# Patient Record
Sex: FEMALE | Race: BLACK OR AFRICAN AMERICAN | NOT HISPANIC OR LATINO | Employment: UNEMPLOYED | URBAN - METROPOLITAN AREA
[De-identification: names, ages, dates, MRNs, and addresses within clinical notes are randomized per-mention and may not be internally consistent; named-entity substitution may affect disease eponyms.]

---

## 2024-02-23 ENCOUNTER — CLINICAL SUPPORT (OUTPATIENT)
Dept: EMERGENCY MEDICINE | Facility: HOSPITAL | Age: 39
End: 2024-02-23
Payer: COMMERCIAL

## 2024-02-23 ENCOUNTER — HOSPITAL ENCOUNTER (EMERGENCY)
Facility: HOSPITAL | Age: 39
Discharge: PSYCHIATRIC OR SUBSTANCE USE FACILITY | End: 2024-02-24
Attending: EMERGENCY MEDICINE
Payer: COMMERCIAL

## 2024-02-23 VITALS
SYSTOLIC BLOOD PRESSURE: 146 MMHG | TEMPERATURE: 97.7 F | RESPIRATION RATE: 16 BRPM | OXYGEN SATURATION: 98 % | BODY MASS INDEX: 29.03 KG/M2 | HEIGHT: 67 IN | WEIGHT: 185 LBS | HEART RATE: 90 BPM | DIASTOLIC BLOOD PRESSURE: 87 MMHG

## 2024-02-23 DIAGNOSIS — F30.9 MANIA (MULTI): Primary | ICD-10-CM

## 2024-02-23 LAB
ALBUMIN SERPL BCP-MCNC: 4.2 G/DL (ref 3.4–5)
ALP SERPL-CCNC: 64 U/L (ref 33–110)
ALT SERPL W P-5'-P-CCNC: 18 U/L (ref 7–45)
AMPHETAMINES UR QL SCN: ABNORMAL
ANION GAP SERPL CALC-SCNC: 13 MMOL/L (ref 10–20)
APAP SERPL-MCNC: <10 UG/ML
APPEARANCE UR: CLEAR
AST SERPL W P-5'-P-CCNC: 26 U/L (ref 9–39)
BACTERIA #/AREA URNS AUTO: ABNORMAL /HPF
BARBITURATES UR QL SCN: ABNORMAL
BASOPHILS # BLD AUTO: 0.03 X10*3/UL (ref 0–0.1)
BASOPHILS NFR BLD AUTO: 0.4 %
BENZODIAZ UR QL SCN: ABNORMAL
BILIRUB SERPL-MCNC: 0.9 MG/DL (ref 0–1.2)
BILIRUB UR STRIP.AUTO-MCNC: NEGATIVE MG/DL
BUN SERPL-MCNC: 7 MG/DL (ref 6–23)
BZE UR QL SCN: ABNORMAL
CALCIUM SERPL-MCNC: 9.5 MG/DL (ref 8.6–10.6)
CANNABINOIDS UR QL SCN: ABNORMAL
CHLORIDE SERPL-SCNC: 106 MMOL/L (ref 98–107)
CO2 SERPL-SCNC: 24 MMOL/L (ref 21–32)
COLOR UR: YELLOW
CREAT SERPL-MCNC: 0.74 MG/DL (ref 0.5–1.05)
EGFRCR SERPLBLD CKD-EPI 2021: >90 ML/MIN/1.73M*2
EOSINOPHIL # BLD AUTO: 0.16 X10*3/UL (ref 0–0.7)
EOSINOPHIL NFR BLD AUTO: 2.3 %
ERYTHROCYTE [DISTWIDTH] IN BLOOD BY AUTOMATED COUNT: 13.3 % (ref 11.5–14.5)
ETHANOL SERPL-MCNC: <10 MG/DL
FENTANYL+NORFENTANYL UR QL SCN: ABNORMAL
FLUAV RNA RESP QL NAA+PROBE: NOT DETECTED
FLUBV RNA RESP QL NAA+PROBE: NOT DETECTED
GLUCOSE SERPL-MCNC: 84 MG/DL (ref 74–99)
GLUCOSE UR STRIP.AUTO-MCNC: NORMAL MG/DL
HCT VFR BLD AUTO: 38.2 % (ref 36–46)
HGB BLD-MCNC: 12.8 G/DL (ref 12–16)
IMM GRANULOCYTES # BLD AUTO: 0.01 X10*3/UL (ref 0–0.7)
IMM GRANULOCYTES NFR BLD AUTO: 0.1 % (ref 0–0.9)
KETONES UR STRIP.AUTO-MCNC: ABNORMAL MG/DL
LEUKOCYTE ESTERASE UR QL STRIP.AUTO: NEGATIVE
LYMPHOCYTES # BLD AUTO: 2.94 X10*3/UL (ref 1.2–4.8)
LYMPHOCYTES NFR BLD AUTO: 41.4 %
MCH RBC QN AUTO: 27.4 PG (ref 26–34)
MCHC RBC AUTO-ENTMCNC: 33.5 G/DL (ref 32–36)
MCV RBC AUTO: 82 FL (ref 80–100)
MONOCYTES # BLD AUTO: 1.06 X10*3/UL (ref 0.1–1)
MONOCYTES NFR BLD AUTO: 14.9 %
MUCOUS THREADS #/AREA URNS AUTO: ABNORMAL /LPF
NEUTROPHILS # BLD AUTO: 2.91 X10*3/UL (ref 1.2–7.7)
NEUTROPHILS NFR BLD AUTO: 40.9 %
NITRITE UR QL STRIP.AUTO: NEGATIVE
NRBC BLD-RTO: 0 /100 WBCS (ref 0–0)
OPIATES UR QL SCN: ABNORMAL
OXYCODONE+OXYMORPHONE UR QL SCN: ABNORMAL
PCP UR QL SCN: ABNORMAL
PH UR STRIP.AUTO: 6 [PH]
PLATELET # BLD AUTO: 366 X10*3/UL (ref 150–450)
POTASSIUM SERPL-SCNC: 4.1 MMOL/L (ref 3.5–5.3)
PREGNANCY TEST URINE, POC: NEGATIVE
PROT SERPL-MCNC: 7.3 G/DL (ref 6.4–8.2)
PROT UR STRIP.AUTO-MCNC: ABNORMAL MG/DL
RBC # BLD AUTO: 4.67 X10*6/UL (ref 4–5.2)
RBC # UR STRIP.AUTO: NEGATIVE /UL
RBC #/AREA URNS AUTO: ABNORMAL /HPF
SALICYLATES SERPL-MCNC: <3 MG/DL
SARS-COV-2 RNA RESP QL NAA+PROBE: NOT DETECTED
SODIUM SERPL-SCNC: 139 MMOL/L (ref 136–145)
SP GR UR STRIP.AUTO: 1.02
SQUAMOUS #/AREA URNS AUTO: ABNORMAL /HPF
UROBILINOGEN UR STRIP.AUTO-MCNC: NORMAL MG/DL
WBC # BLD AUTO: 7.1 X10*3/UL (ref 4.4–11.3)
WBC #/AREA URNS AUTO: ABNORMAL /HPF

## 2024-02-23 PROCEDURE — 93010 ELECTROCARDIOGRAM REPORT: CPT | Performed by: EMERGENCY MEDICINE

## 2024-02-23 PROCEDURE — 81025 URINE PREGNANCY TEST: CPT | Performed by: STUDENT IN AN ORGANIZED HEALTH CARE EDUCATION/TRAINING PROGRAM

## 2024-02-23 PROCEDURE — 99285 EMERGENCY DEPT VISIT HI MDM: CPT | Performed by: EMERGENCY MEDICINE

## 2024-02-23 PROCEDURE — 93005 ELECTROCARDIOGRAM TRACING: CPT

## 2024-02-23 PROCEDURE — 80143 DRUG ASSAY ACETAMINOPHEN: CPT | Performed by: STUDENT IN AN ORGANIZED HEALTH CARE EDUCATION/TRAINING PROGRAM

## 2024-02-23 PROCEDURE — 80307 DRUG TEST PRSMV CHEM ANLYZR: CPT | Performed by: EMERGENCY MEDICINE

## 2024-02-23 PROCEDURE — 87636 SARSCOV2 & INF A&B AMP PRB: CPT | Performed by: STUDENT IN AN ORGANIZED HEALTH CARE EDUCATION/TRAINING PROGRAM

## 2024-02-23 PROCEDURE — 36415 COLL VENOUS BLD VENIPUNCTURE: CPT | Performed by: STUDENT IN AN ORGANIZED HEALTH CARE EDUCATION/TRAINING PROGRAM

## 2024-02-23 PROCEDURE — 80053 COMPREHEN METABOLIC PANEL: CPT | Performed by: STUDENT IN AN ORGANIZED HEALTH CARE EDUCATION/TRAINING PROGRAM

## 2024-02-23 PROCEDURE — 85025 COMPLETE CBC W/AUTO DIFF WBC: CPT | Performed by: STUDENT IN AN ORGANIZED HEALTH CARE EDUCATION/TRAINING PROGRAM

## 2024-02-23 PROCEDURE — 81001 URINALYSIS AUTO W/SCOPE: CPT | Mod: 59 | Performed by: STUDENT IN AN ORGANIZED HEALTH CARE EDUCATION/TRAINING PROGRAM

## 2024-02-23 SDOH — HEALTH STABILITY: MENTAL HEALTH: DEPRESSION SYMPTOMS: NO PROBLEMS REPORTED OR OBSERVED.

## 2024-02-23 SDOH — HEALTH STABILITY: MENTAL HEALTH: HAVE YOU EVER TRIED TO KILL YOURSELF?: NO

## 2024-02-23 SDOH — HEALTH STABILITY: MENTAL HEALTH: WISH TO BE DEAD (PAST 1 MONTH): NO

## 2024-02-23 SDOH — HEALTH STABILITY: MENTAL HEALTH: IN THE PAST FEW WEEKS, HAVE YOU WISHED YOU WERE DEAD?: NO

## 2024-02-23 SDOH — HEALTH STABILITY: MENTAL HEALTH: IN THE PAST WEEK, HAVE YOU BEEN HAVING THOUGHTS ABOUT KILLING YOURSELF?: NO

## 2024-02-23 SDOH — HEALTH STABILITY: MENTAL HEALTH: NON-SPECIFIC ACTIVE SUICIDAL THOUGHTS (PAST 1 MONTH): NO

## 2024-02-23 SDOH — HEALTH STABILITY: MENTAL HEALTH: IN THE PAST FEW WEEKS, HAVE YOU FELT THAT YOU OR YOUR FAMILY WOULD BE BETTER OFF IF YOU WERE DEAD?: NO

## 2024-02-23 SDOH — HEALTH STABILITY: MENTAL HEALTH: SUICIDAL BEHAVIOR (LIFETIME): NO

## 2024-02-23 SDOH — HEALTH STABILITY: MENTAL HEALTH: ARE YOU HAVING THOUGHTS OF KILLING YOURSELF RIGHT NOW?: NO

## 2024-02-23 SDOH — HEALTH STABILITY: MENTAL HEALTH: ANXIETY SYMPTOMS: GENERALIZED

## 2024-02-23 SDOH — ECONOMIC STABILITY: HOUSING INSECURITY: FEELS SAFE LIVING IN HOME: YES

## 2024-02-23 ASSESSMENT — LIFESTYLE VARIABLES
HAVE YOU EVER FELT YOU SHOULD CUT DOWN ON YOUR DRINKING: NO
SUBSTANCE_ABUSE_PAST_12_MONTHS: YES
EVER FELT BAD OR GUILTY ABOUT YOUR DRINKING: NO
PRESCIPTION_ABUSE_PAST_12_MONTHS: NO
EVER HAD A DRINK FIRST THING IN THE MORNING TO STEADY YOUR NERVES TO GET RID OF A HANGOVER: NO
HAVE PEOPLE ANNOYED YOU BY CRITICIZING YOUR DRINKING: NO

## 2024-02-23 ASSESSMENT — COLUMBIA-SUICIDE SEVERITY RATING SCALE - C-SSRS
6. HAVE YOU EVER DONE ANYTHING, STARTED TO DO ANYTHING, OR PREPARED TO DO ANYTHING TO END YOUR LIFE?: NO
1. IN THE PAST MONTH, HAVE YOU WISHED YOU WERE DEAD OR WISHED YOU COULD GO TO SLEEP AND NOT WAKE UP?: NO
2. HAVE YOU ACTUALLY HAD ANY THOUGHTS OF KILLING YOURSELF?: NO

## 2024-02-23 ASSESSMENT — PAIN SCALES - GENERAL: PAINLEVEL_OUTOF10: 0 - NO PAIN

## 2024-02-23 ASSESSMENT — PAIN DESCRIPTION - PROGRESSION: CLINICAL_PROGRESSION: NOT CHANGED

## 2024-02-23 ASSESSMENT — PAIN - FUNCTIONAL ASSESSMENT: PAIN_FUNCTIONAL_ASSESSMENT: 0-10
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
## 2024-02-23 NOTE — PROGRESS NOTES
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
## 2024-02-23 NOTE — PROGRESS NOTES
Patient was handed off to me from the previous team. For full history, physical, and prior ED course, please see previous provider note prior to patient handoff. This is an addendum to the record.    Briefly, this is a 38-year-old female presenting with concern for psychosis in the setting of her bipolar disorder and not being on medication.  Upper at time of signout, patient was pending placement by EPAT.  On review of workup, patient is medically cleared to be placed in an inpatient psychiatric facility.  Patient has remained hemodynamically stable throughout my shift with no acute events.      Throughout the ED stay, the patient was monitored and re-examined for any changes in stability or symptomatology.     Pt seen and discussed with Dr. Shen.    Sarah Alberto DO.  Emergency Medicine PGY-2

## 2024-02-23 NOTE — ED TRIAGE NOTES
Patient arrives to the ED via PD and EMS for and Psychiatric Evaluation. Patient daughter called emergency services d/t patient being off meds and having a manic episode. Patient denies SI/HI, auditory and visual hallucinations, but says she's Anglican and thought are scattered.

## 2024-02-23 NOTE — ED PROVIDER NOTES
HPI   Chief Complaint   Patient presents with    Psychiatric Evaluation       Is a 38-year-old female, presenting to ED brought in by EMS for psych evaluation.  She was at the time of exam, she reports that she is working for the [employer] and currently on admission, which she is difficult to provide the details with, she otherwise does not have any active complaints medically.                          Ki[place] Coma Scale Score: 15                     Patient History   No past medical history on file.  No past surgical history on file.  No family history on file.  Social History     Tobacco Use    Smoking status: Not on file    Smokeless tobacco: Not on file   Substance Use Topics    Alcohol use: Not on file    Drug use: Not on file       Physical Exam   ED Triage Vitals   Temp Pulse Resp BP   -- -- -- --      SpO2 Temp src Heart Rate Source Patient Position   -- -- -- --      BP Location FiO2 (%)     -- --       Physical Exam  Constitutional:       Appearance: Normal appearance.   HENT:      Head: Normocephalic and atraumatic.      Mouth/Throat:      Mouth: Mucous membranes are moist.   Eyes:      Extraocular Movements: Extraocular movements intact.   Cardiovascular:      Rate and Rhythm: Normal rate and regular rhythm.      Heart sounds: Normal heart sounds. No murmur heard.  Pulmonary:      Effort: Pulmonary effort is normal. No respiratory distress.      Breath sounds: Normal breath sounds. No wheezing.   Abdominal:      General: There is no distension.      Palpations: Abdomen is soft.      Tenderness: There is no abdominal tenderness. There is no guarding.   Musculoskeletal:      Right lower leg: No edema.      Left lower leg: No edema.   Skin:     General: Skin is warm and dry.   Neurological:      General: No focal deficit present.      Mental Status: She is alert and oriented to person, place, and time.   Psychiatric:      Comments: Rushed speech, scattered thoughts, difficult to redirect         ED Course & MDM    ED Course as of 02/25/24 2330 Fri Feb 23, 2024   1613 Patient is medically cleared to be placed by EPAT. [PW]      ED Course User Index  [PW] Sarah Alberto DO         Diagnoses as of 02/25/24 2330   Lindsay ([facility])       Medical Decision Making  Patient presents to the ED brought in by EMS appearing to be in a manic state.  She is tangential and has rushed speech however she is cooperative.  On my chart reviewed.  She does have a history of bipolar and has required inpatient psychiatric hospitalization as recently as March 8, 2022.  It appears previously she was on Abilify.  Per EMS report, the patient's daughter did report that she has not been on her psychiatric medication.  Due to this, we will obtain laboratory studies to medically clear her for psychiatric evaluation.    Laboratory studies are grossly unremarkable, she is cleared for EPAT evaluation    EKG interpreted by myself: Normal sinus rhythm, ventricular rate 95, normal axis, QTc 442 ms, no acute injury pattern noted.    Case discussed with EPAT, who recommended for admission.  I am in agreement with this plan  The patient is signed out to oncoming resident physician pending EPAT placement    Discussed with the attending  Ludin Tran DO PGY-4  Emergency Medicine          Procedure  Procedures     Ludin Tran DO  Resident  02/25/24 2330

## 2024-02-24 ENCOUNTER — HOSPITAL ENCOUNTER (INPATIENT)
Facility: HOSPITAL | Age: 39
LOS: 4 days | Discharge: HOME | DRG: 885 | End: 2024-02-28
Attending: PSYCHIATRY & NEUROLOGY | Admitting: PSYCHIATRY & NEUROLOGY
Payer: COMMERCIAL

## 2024-02-24 DIAGNOSIS — F25.0 SCHIZOAFFECTIVE DISORDER, BIPOLAR TYPE (MULTI): Primary | ICD-10-CM

## 2024-02-24 PROBLEM — F31.9: Status: ACTIVE | Noted: 2024-02-24

## 2024-02-24 LAB — HOLD SPECIMEN: NORMAL

## 2024-02-24 PROCEDURE — 99222 1ST HOSP IP/OBS MODERATE 55: CPT | Performed by: NURSE PRACTITIONER

## 2024-02-24 PROCEDURE — 2500000004 HC RX 250 GENERAL PHARMACY W/ HCPCS (ALT 636 FOR OP/ED): Performed by: PSYCHIATRY & NEUROLOGY

## 2024-02-24 PROCEDURE — 1240000001 HC SEMI-PRIVATE BH ROOM DAILY

## 2024-02-24 RX ORDER — LORAZEPAM 1 MG/1
1 TABLET ORAL EVERY 6 HOURS PRN
Status: DISCONTINUED | OUTPATIENT
Start: 2024-02-24 | End: 2024-02-28 | Stop reason: HOSPADM

## 2024-02-24 RX ORDER — TRAZODONE HYDROCHLORIDE 50 MG/1
50 TABLET ORAL NIGHTLY PRN
Status: DISCONTINUED | OUTPATIENT
Start: 2024-02-24 | End: 2024-02-28 | Stop reason: HOSPADM

## 2024-02-24 RX ORDER — LORAZEPAM 2 MG/ML
1 INJECTION INTRAMUSCULAR EVERY 6 HOURS PRN
Status: DISCONTINUED | OUTPATIENT
Start: 2024-02-24 | End: 2024-02-28 | Stop reason: HOSPADM

## 2024-02-24 RX ORDER — HYDRALAZINE HYDROCHLORIDE 25 MG/1
25 TABLET, FILM COATED ORAL EVERY 8 HOURS PRN
Status: DISCONTINUED | OUTPATIENT
Start: 2024-02-24 | End: 2024-02-28 | Stop reason: HOSPADM

## 2024-02-24 RX ORDER — IBUPROFEN 400 MG/1
400 TABLET ORAL EVERY 6 HOURS PRN
Status: DISCONTINUED | OUTPATIENT
Start: 2024-02-24 | End: 2024-02-28 | Stop reason: HOSPADM

## 2024-02-24 RX ORDER — OLANZAPINE 10 MG/2ML
5 INJECTION, POWDER, FOR SOLUTION INTRAMUSCULAR EVERY 6 HOURS PRN
Status: DISCONTINUED | OUTPATIENT
Start: 2024-02-24 | End: 2024-02-24

## 2024-02-24 RX ORDER — ACETAMINOPHEN 325 MG/1
650 TABLET ORAL EVERY 4 HOURS PRN
Status: DISCONTINUED | OUTPATIENT
Start: 2024-02-24 | End: 2024-02-28 | Stop reason: HOSPADM

## 2024-02-24 RX ORDER — DIPHENHYDRAMINE HCL 25 MG
50 TABLET ORAL EVERY 6 HOURS PRN
Status: DISCONTINUED | OUTPATIENT
Start: 2024-02-24 | End: 2024-02-28 | Stop reason: HOSPADM

## 2024-02-24 RX ORDER — MAGNESIUM HYDROXIDE 2400 MG/10ML
10 SUSPENSION ORAL DAILY PRN
Status: DISCONTINUED | OUTPATIENT
Start: 2024-02-24 | End: 2024-02-28 | Stop reason: HOSPADM

## 2024-02-24 RX ORDER — ALUMINUM HYDROXIDE, MAGNESIUM HYDROXIDE, AND SIMETHICONE 1200; 120; 1200 MG/30ML; MG/30ML; MG/30ML
30 SUSPENSION ORAL EVERY 6 HOURS PRN
Status: DISCONTINUED | OUTPATIENT
Start: 2024-02-24 | End: 2024-02-28 | Stop reason: HOSPADM

## 2024-02-24 RX ORDER — HYDROXYZINE HYDROCHLORIDE 25 MG/1
50 TABLET, FILM COATED ORAL EVERY 6 HOURS PRN
Status: DISCONTINUED | OUTPATIENT
Start: 2024-02-24 | End: 2024-02-28 | Stop reason: HOSPADM

## 2024-02-24 RX ORDER — PALIPERIDONE 3 MG/1
6 TABLET, EXTENDED RELEASE ORAL DAILY
Status: DISCONTINUED | OUTPATIENT
Start: 2024-02-24 | End: 2024-02-25

## 2024-02-24 RX ORDER — HALOPERIDOL 5 MG/1
5 TABLET ORAL EVERY 6 HOURS PRN
Status: DISCONTINUED | OUTPATIENT
Start: 2024-02-24 | End: 2024-02-28 | Stop reason: HOSPADM

## 2024-02-24 RX ORDER — OLANZAPINE 5 MG/1
5 TABLET ORAL EVERY 6 HOURS PRN
Status: DISCONTINUED | OUTPATIENT
Start: 2024-02-24 | End: 2024-02-24

## 2024-02-24 RX ORDER — HALOPERIDOL 5 MG/ML
5 INJECTION INTRAMUSCULAR EVERY 6 HOURS PRN
Status: DISCONTINUED | OUTPATIENT
Start: 2024-02-24 | End: 2024-02-28 | Stop reason: HOSPADM

## 2024-02-24 RX ORDER — DIPHENHYDRAMINE HYDROCHLORIDE 50 MG/ML
50 INJECTION INTRAMUSCULAR; INTRAVENOUS ONCE AS NEEDED
Status: DISCONTINUED | OUTPATIENT
Start: 2024-02-24 | End: 2024-02-28 | Stop reason: HOSPADM

## 2024-02-24 RX ADMIN — LORAZEPAM 1 MG: 2 INJECTION INTRAMUSCULAR; INTRAVENOUS at 22:47

## 2024-02-24 RX ADMIN — OLANZAPINE 5 MG: 10 INJECTION, POWDER, FOR SOLUTION INTRAMUSCULAR at 07:54

## 2024-02-24 RX ADMIN — HALOPERIDOL LACTATE 5 MG: 5 INJECTION, SOLUTION INTRAMUSCULAR at 22:47

## 2024-02-24 SDOH — SOCIAL STABILITY: SOCIAL INSECURITY: ARE YOU OR HAVE YOU BEEN THREATENED OR ABUSED PHYSICALLY, EMOTIONALLY, OR SEXUALLY BY ANYONE?: UNABLE TO ASSESS

## 2024-02-24 SDOH — SOCIAL STABILITY: SOCIAL INSECURITY: DOES ANYONE TRY TO KEEP YOU FROM HAVING/CONTACTING OTHER FRIENDS OR DOING THINGS OUTSIDE YOUR HOME?: UNABLE TO ASSESS

## 2024-02-24 SDOH — SOCIAL STABILITY: SOCIAL INSECURITY: WERE YOU ABLE TO COMPLETE ALL THE BEHAVIORAL HEALTH SCREENINGS?: NO

## 2024-02-24 SDOH — SOCIAL STABILITY: SOCIAL INSECURITY: ABUSE: ADULT

## 2024-02-24 SDOH — SOCIAL STABILITY: SOCIAL INSECURITY: HAS ANYONE EVER THREATENED TO HURT YOUR FAMILY OR YOUR PETS?: UNABLE TO ASSESS

## 2024-02-24 SDOH — SOCIAL STABILITY: SOCIAL INSECURITY: ARE THERE ANY APPARENT SIGNS OF INJURIES/BEHAVIORS THAT COULD BE RELATED TO ABUSE/NEGLECT?: UNABLE TO ASSESS

## 2024-02-24 SDOH — SOCIAL STABILITY: SOCIAL INSECURITY: DO YOU FEEL ANYONE HAS EXPLOITED OR TAKEN ADVANTAGE OF YOU FINANCIALLY OR OF YOUR PERSONAL PROPERTY?: UNABLE TO ASSESS

## 2024-02-24 SDOH — SOCIAL STABILITY: SOCIAL INSECURITY: HAVE YOU HAD THOUGHTS OF HARMING ANYONE ELSE?: NO

## 2024-02-24 SDOH — SOCIAL STABILITY: SOCIAL INSECURITY: DO YOU FEEL UNSAFE GOING BACK TO THE PLACE WHERE YOU ARE LIVING?: UNABLE TO ASSESS

## 2024-02-24 ASSESSMENT — LIFESTYLE VARIABLES
HOW MANY STANDARD DRINKS CONTAINING ALCOHOL DO YOU HAVE ON A TYPICAL DAY: 1 OR 2
SKIP TO QUESTIONS 9-10: 1
AUDIT-C TOTAL SCORE: 1
HOW OFTEN DO YOU HAVE A DRINK CONTAINING ALCOHOL: MONTHLY OR LESS
AUDITORY DISTURBANCES: NOT PRESENT
ORIENTATION AND CLOUDING OF SENSORIUM: ORIENTED AND CAN DO SERIAL ADDITIONS
PAROXYSMAL SWEATS: NO SWEAT VISIBLE
NAUSEA AND VOMITING: NO NAUSEA AND NO VOMITING
TREMOR: NO TREMOR
AGITATION: NORMAL ACTIVITY
VISUAL DISTURBANCES: NOT PRESENT
ANXIETY: MILDLY ANXIOUS
HOW OFTEN DO YOU HAVE 6 OR MORE DRINKS ON ONE OCCASION: NEVER
AUDIT-C TOTAL SCORE: 1
CIWA TOTAL SCORE: 1
HEADACHE, FULLNESS IN HEAD: NOT PRESENT
TOTAL_SCORE: 5

## 2024-02-24 ASSESSMENT — PAIN SCALES - GENERAL
PAINLEVEL_OUTOF10: 0 - NO PAIN

## 2024-02-24 ASSESSMENT — ACTIVITIES OF DAILY LIVING (ADL)
LACK_OF_TRANSPORTATION: YES
WALKS IN HOME: INDEPENDENT
TOILETING: INDEPENDENT
GROOMING: INDEPENDENT
BATHING: INDEPENDENT
HEARING - RIGHT EAR: FUNCTIONAL
FEEDING YOURSELF: INDEPENDENT
ADEQUATE_TO_COMPLETE_ADL: YES
JUDGMENT_ADEQUATE_SAFELY_COMPLETE_DAILY_ACTIVITIES: NO
HEARING - LEFT EAR: FUNCTIONAL
DRESSING YOURSELF: INDEPENDENT
PATIENT'S MEMORY ADEQUATE TO SAFELY COMPLETE DAILY ACTIVITIES?: UNABLE TO ASSESS

## 2024-02-24 ASSESSMENT — PAIN - FUNCTIONAL ASSESSMENT
PAIN_FUNCTIONAL_ASSESSMENT: 0-10

## 2024-02-24 ASSESSMENT — PATIENT HEALTH QUESTIONNAIRE - PHQ9
1. LITTLE INTEREST OR PLEASURE IN DOING THINGS: NOT AT ALL
2. FEELING DOWN, DEPRESSED OR HOPELESS: NOT AT ALL
SUM OF ALL RESPONSES TO PHQ9 QUESTIONS 1 & 2: 0

## 2024-02-24 ASSESSMENT — COLUMBIA-SUICIDE SEVERITY RATING SCALE - C-SSRS
2. HAVE YOU ACTUALLY HAD ANY THOUGHTS OF KILLING YOURSELF?: NO
1. SINCE LAST CONTACT, HAVE YOU WISHED YOU WERE DEAD OR WISHED YOU COULD GO TO SLEEP AND NOT WAKE UP?: NO
6. HAVE YOU EVER DONE ANYTHING, STARTED TO DO ANYTHING, OR PREPARED TO DO ANYTHING TO END YOUR LIFE?: NO

## 2024-02-24 NOTE — CONSULTS
Consults    Hospital medicine consulted for Adult Medical Examination and Optimization for Behavioral Health Unit    History Of Present Illness  This 38-year-old female admitted to the inpatient psychiatric unit at [facility].  Patient was brought in via EMS after being called by patient's daughter.  Per patient's family patient has been off of her psychiatric medications.  Patient exhibiting significant manic behavior with disorganized thinking described as scattered thoughts on ER provider notes.  In the ED admitting providers inquired in regards to SI and HI both of which patient denied. Patient was medically cleared for EPAT evaluation. Hospitalist team consulted for adult medical examination optimization for behavioral health unit.    On vital sign review patient with fluctuating blood pressures but did note hypertensive urgency with sinus tachycardia around 7 AM on 2/24/2024.  Patient with significant manic behavior and agitation requiring IM Zyprexa by nursing at the time of noted vital signs.     Hospitalist to evaluate medical optimization for psychiatric treatment and evaluation.  Neurological evaluation completed.  No acute or chronic medical issues identified at this time that could be contributing to underlying psychiatric symptoms. Pt is medically optimized for inpatient behavioral health evaluation and treatment.    Patient examined and seen. Alert and oriented x3, explained to patient assessment, right to , and the right to decline assessment. Patient verbalized understanding and agreed to assessment without . Patient denies chest pain, shortness of breath, palpitations, abdominal pain, fever or chills.    Review of systems: 10 system were reviewed and were negative except what was mentioned in history of present illness    Past Medical History  -Bipolar disorder    -4/2022 Thyroid US: heterogenous thyroid without definite dominant nodule, possibly representing sequale of  thyroiditis, suggest co-relation with thyroid function studies and non emergent thyroid sonography. TSH 3/2023 ENL at 1.26    -Presumed Asthma: cough and SOB with wheezing intermittently sine age of 14. Has refused OP PFTs for asthma workup with PCP per PCP notes    Surgical History   x 3     Social History  Tobacco smoker, marijuana use (UDS + THC)  Social History     Socioeconomic History    Marital status: Single     Spouse name: Not on file    Number of children: Not on file    Years of education: Not on file    Highest education level: Not on file   Occupational History    Not on file   Tobacco Use    Smoking status: Former     Types: Cigarettes    Smokeless tobacco: Not on file   Substance and Sexual Activity    Alcohol use: Not on file    Drug use: Not on file    Sexual activity: Not on file   Other Topics Concern    Not on file   Social History Narrative    Not on file     Social Determinants of Health     Financial Resource Strain: Patient Unable To Answer (2024)    Overall Financial Resource Strain (CARDIA)     Difficulty of Paying Living Expenses: Patient unable to answer   Food Insecurity: Not on file   Transportation Needs: Unmet Transportation Needs (2024)    PRAPARE - Transportation     Lack of Transportation (Medical): Yes     Lack of Transportation (Non-Medical): Yes   Physical Activity: Not on file   Stress: Not on file   Social Connections: Not on file   Intimate Partner Violence: Not on file   Housing Stability: High Risk (2024)    Housing Stability Vital Sign     Unable to Pay for Housing in the Last Year: Yes     Number of Places Lived in the Last Year: 2     Unstable Housing in the Last Year: Yes        Family History  CVA- mother, non-specified heart problems-father     Allergies  No Known Allergies       Physical Exam  Constitutional: Well developed, awake/alert/oriented x3, no distress, cooperative  Eyes: PERRL, EOMI, clear sclera  ENMT: mucous membranes moist, no  apparent injury, no lesions seen  Head/Neck: Neck supple, no apparent injury, thyroid without mass or tenderness  Respiratory/Thorax: Patent airways,  normal breath sounds with good   Cardiovascular: Regular, rate and rhythm, no murmurs, 2+ equal pulses of the extremities, normal S 1and S 2  Gastrointestinal: Nondistended, soft, non-tender,    Genitourinary: denies CVA tenderness  or suprapubic tenderness,  voiding freely   Musculoskeletal: ROM intact  Extremities: normal extremities,  no contusions or wounds seen   Skin: warm, dry, intact except as noted   Neurological: alert/oriented x 3, speech clear, cranial nerves II through XII  grossly intact  Psychiatric: pleasant   Cranial Nerve Exam: II, III, IV, VI: Visual acuity within normal limits bilaterally, visual fields normal in all quadrants, LESLYE, EOMI  Cranial Nerve exam: V: Facial sensations intact bilaterally to dull, sharp, and light touch stimuli  Cranial Nerve exam VII: Facial muscle strength normal/equal bilaterally  Cranial Nerve Exam VIII: Hearing is normal bilaterally  Cranial Nerve IX,X: Palate and Uvula symmetrical, voice is normal  Cranial Nerve XI: Shoulder shrug strong, equal bilaterally  Cranial Nerve XII: Tongue moves symmetrically  Motor : Good muscle tone, Strength equal upper and lower extremities unless otherwise stated above  Cerebellar: normal gait unless otherwise stated above     Last Recorded Vitals  Visit Vitals  BP (!) 141/103   Pulse (!) 122   Temp 36.4 °C (97.5 °F)   Resp 16        Scheduled medications     Continuous medications     PRN medications  PRN medications: acetaminophen, alum-mag hydroxide-simeth, diphenhydrAMINE **OR** diphenhydrAMINE, hydrOXYzine HCL, ibuprofen, magnesium hydroxide, OLANZapine **OR** OLANZapine, traZODone     Relevant Results  Results for orders placed or performed during the hospital encounter of 02/23/24 (from the past 96 hour(s))   CBC and Auto Differential   Result Value Ref Range    WBC 7.1 4.4 -  11.3 x10*3/uL    nRBC 0.0 0.0 - 0.0 /100 WBCs    RBC 4.67 4.00 - 5.20 x10*6/uL    Hemoglobin 12.8 12.0 - 16.0 g/dL    Hematocrit 38.2 36.0 - 46.0 %    MCV 82 80 - 100 fL    MCH 27.4 26.0 - 34.0 pg    MCHC 33.5 32.0 - 36.0 g/dL    RDW 13.3 11.5 - 14.5 %    Platelets 366 150 - 450 x10*3/uL    Neutrophils % 40.9 40.0 - 80.0 %    Immature Granulocytes %, Automated 0.1 0.0 - 0.9 %    Lymphocytes % 41.4 13.0 - 44.0 %    Monocytes % 14.9 2.0 - 10.0 %    Eosinophils % 2.3 0.0 - 6.0 %    Basophils % 0.4 0.0 - 2.0 %    Neutrophils Absolute 2.91 1.20 - 7.70 x10*3/uL    Immature Granulocytes Absolute, Automated 0.01 0.00 - 0.70 x10*3/uL    Lymphocytes Absolute 2.94 1.20 - 4.80 x10*3/uL    Monocytes Absolute 1.06 (H) 0.10 - 1.00 x10*3/uL    Eosinophils Absolute 0.16 0.00 - 0.70 x10*3/uL    Basophils Absolute 0.03 0.00 - 0.10 x10*3/uL   Comprehensive metabolic panel   Result Value Ref Range    Glucose 84 74 - 99 mg/dL    Sodium 139 136 - 145 mmol/L    Potassium 4.1 3.5 - 5.3 mmol/L    Chloride 106 98 - 107 mmol/L    Bicarbonate 24 21 - 32 mmol/L    Anion Gap 13 10 - 20 mmol/L    Urea Nitrogen 7 6 - 23 mg/dL    Creatinine 0.74 0.50 - 1.05 mg/dL    eGFR >90 >60 mL/min/1.73m*2    Calcium 9.5 8.6 - 10.6 mg/dL    Albumin 4.2 3.4 - 5.0 g/dL    Alkaline Phosphatase 64 33 - 110 U/L    Total Protein 7.3 6.4 - 8.2 g/dL    AST 26 9 - 39 U/L    Bilirubin, Total 0.9 0.0 - 1.2 mg/dL    ALT 18 7 - 45 U/L   Acute Toxicology Panel, Blood   Result Value Ref Range    Acetaminophen <10.0 10.0 - 30.0 ug/mL    Salicylate  <3 4 - 20 mg/dL    Alcohol <10 <=10 mg/dL   Sars-CoV-2 and Influenza A/B PCR   Result Value Ref Range    Flu A Result Not Detected Not Detected    Flu B Result Not Detected Not Detected    Coronavirus 2019, PCR Not Detected Not Detected   Urinalysis with Reflex Culture and Microscopic   Result Value Ref Range    Color, Urine Yellow Light-Yellow, Yellow, Dark-Yellow    Appearance, Urine Clear Clear    Specific Gravity, Urine 1.020  1.005 - 1.035    pH, Urine 6.0 5.0, 5.5, 6.0, 6.5, 7.0, 7.5, 8.0    Protein, Urine 30 (1+) (A) NEGATIVE, 10 (TRACE), 20 (TRACE) mg/dL    Glucose, Urine Normal Normal mg/dL    Blood, Urine NEGATIVE NEGATIVE    Ketones, Urine 20 (1+) (A) NEGATIVE mg/dL    Bilirubin, Urine NEGATIVE NEGATIVE    Urobilinogen, Urine Normal Normal mg/dL    Nitrite, Urine NEGATIVE NEGATIVE    Leukocyte Esterase, Urine NEGATIVE NEGATIVE   Extra Urine Gray Tube   Result Value Ref Range    Extra Tube Hold for add-ons.    Drug Screen, Urine   Result Value Ref Range    Amphetamine Screen, Urine Presumptive Negative Presumptive Negative    Barbiturate Screen, Urine Presumptive Negative Presumptive Negative    Benzodiazepines Screen, Urine Presumptive Negative Presumptive Negative    Cannabinoid Screen, Urine Presumptive Positive (A) Presumptive Negative    Cocaine Metabolite Screen, Urine Presumptive Negative Presumptive Negative    Fentanyl Screen, Urine Presumptive Negative Presumptive Negative    Opiate Screen, Urine Presumptive Negative Presumptive Negative    Oxycodone Screen, Urine Presumptive Negative Presumptive Negative    PCP Screen, Urine Presumptive Negative Presumptive Negative   Urinalysis Microscopic   Result Value Ref Range    WBC, Urine 1-5 1-5, NONE /HPF    RBC, Urine NONE NONE, 1-2, 3-5 /HPF    Squamous Epithelial Cells, Urine 1-9 (SPARSE) Reference range not established. /HPF    Bacteria, Urine 1+ (A) NONE SEEN /HPF    Mucus, Urine 4+ Reference range not established. /LPF   POCT pregnancy, urine   Result Value Ref Range    Preg Test, Ur Negative Negative        EKG: Normal sinus rhythm 95 bpm, possible left atrial enlargement.  QTc 442 ms.  No acute ischemic changes.     Assessment and Plan    Principal Problem:    Bipolar disorder, current condition not specified as either manic or depressive  Admit to Behavioral Health Unit  Contract for Safety   Safety Protocols  Medications to be determined by psychiatry   Vital Signs  BID  Group Therapy as appropriate  Social Work for outpatient therapy   Encourage Healthy Lifestyle and Exercise as appropriate    Tobacco Dependency / Substance use  Nicotine cessation   Risks discussed  Encourage abstinence of tobacco and marijuana  Inpatient/Outpatient treatment therapies  Nicotine patch at patient request     Intermittent HTN urgency during agitation  Continue to monitor BP trend.   Utilize prn PO hydralazine  TSH 3/2023 1.26    Obesity  Dietary control and increased physical activity   Follow up with PCP for management of this and other chronic/non-acute conditions and/or symptoms     Hospital medicine will follow BP trend peripherally.     A total of 68 minutes was spent on this visit    Plan of care was discussed extensively with patient. Patient verbalized understanding through teach back method. All questions and concerns addressed upon examination.     Of note, this documentation is completed using the Dragon Dictation system (voice recognition software). There may be spelling and/or grammatical errors that were not corrected prior to final submission.

## 2024-02-24 NOTE — CARE PLAN
The patient's goals for the shift include send me home    The clinical goals for the shift include no med side effects    Over the shift, the patient did not make progress toward the following goals of taking her meds. Barriers to progression include recent admission. Recommendations to address these barriers include continued hospitalization and med review.     See note from this morning. Pt awoke after received zyprexa for lunch and remained internally stimulated, declined invega. Pt not respecting personal space. Pt can be verbally threatening at times. Pt did respond well to having radio. Pt ate well at  lunch and dinner. Pt on phone yelling at someone but did brin phone back to desk and did not act out in any way or throw phone or radio. Pt denies being suicidal. Pt denies hallucinations though heard and seen talking to self and walls.

## 2024-02-24 NOTE — SIGNIFICANT EVENT
Application for Emergency Admission      Ready for Transfer?  Is the patient medically cleared for transfer to inpatient psychiatry: Yes  Has the patient been accepted to an inpatient psychiatric hospital: Yes    Application for Emergency Admission  IN ACCORDANCE [place]  The Chief Clinical Officer of: MARLA Romero 2/23/2024 .8:34 PM    Reason for Hospitalization  The undersigned has reason to believe that: Aida Butts Is a mentally ill person subject to hospitalization by court order under division B Section 5122.01 of the Revised Code, i.e., this person:    1.Yes  Represents a substantial risk of physical harm to self as manifested by evidence of threats of, or attempts at, suicide or serious self-inflicted bodily harm    2.Yes Represents a substantial risk of physical harm to others as manifested by evidence of recent homicidal or other violent behavior, evidence of recent threats that place another in reasonable fear of violent behavior and serious physical harm, or other evidence of present dangerousness    3.Yes Represents a substantial and immediate risk of serious physical impairment or injury to self as manifested by  evidence that the person is unable to provide for and is not providing for the person's basic physical needs because of the person's mental illness and that appropriate provision for those needs cannot be made  immediately available in the community    4.Yes Would benefit from treatment in a hospital for his mental illness and is in need of such treatment as manifested by evidence of behavior that creates a grave and imminent risk to substantial rights of others or  himself.    5.Yes Would benefit from treatment as manifested by evidence of behavior that indicates all of the following:       (a) The person is unlikely to survive safely in the community without supervision, based on a clinical determination.       (b) The person has a history of lack of compliance with  treatment for mental illness and one of the following applies:      (i) At least twice within the thirty-six months prior to the filing of an affidavit seeking court-ordered treatment of the person under section 5122.111 of the Revised Code, the lack of compliance has been a significant factor in necessitating hospitalization in a hospital or receipt of services in a forensic or other mental health unit of a correctional facility, provided that the thirty-six-month period shall be extended by the length of any hospitalization or incarceration of the person that occurred within the thirty-six-month period.      (ii) Within the forty-eight months prior to the filing of an affidavit seeking court-ordered treatment of the person under section 5122.111 of the Revised Code, the lack of compliance resulted in one or more acts of serious violent behavior toward self or others or threats of, or attempts at, serious physical harm to self or others, provided that the forty-eight-month period shall be extended by the length of any hospitalization or incarceration of the person that occurred within the forty-eight-month period.      (c) The person, as a result of mental illness, is unlikely to voluntarily participate in necessary treatment.       (d) In view of the person's treatment history and current behavior, the person is in need of treatment in order to prevent a relapse or deterioration that would be likely to result in substantial risk of serious harm to the person or others.    (e) Represents a substantial risk of physical harm to self or others if allowed to remain at liberty pending examination.    Therefore, it is requested that said person be admitted to the above named facility.    STATEMENT OF BELIEF    Must be filled out by one of the following: a psychiatrist, licensed physician, licensed clinical psychologist, health or ,  or .  (Statement shall include the circumstances under  which the individual was taken into custody and the reason for the person's belief that hospitalization is necessary. The statement shall also include a reference to efforts made to secure the individual's property at his residence if he was taken into custody there. Every reasonable and appropriate effort should be made to take this person into custody in the least conspicuous manner possible.)    Patient demonstrates clinical signs of decompensated bipolar disorder secondary to poor adherence to medication.  Demonstrated manic thoughts and pressured speech, flight of ideas.  Patient would benefit from admission to inpatient psychiatric facility for stabilization of her known mental health disorder.    Sarah Alberto,  2/23/2024     _____________________________________________________________   Place of Employment: [facility]    STATEMENT OF OBSERVATION BY PSYCHIATRIST, LICENSED PHYSICIAN, OR LICENSED CLINICAL PSYCHOLOGIST, IF APPLICABLE    Place of Observation (e.g., [facility], general hospital, office, emergency facility)  (If applicable, please complete)    Sarah Alberto DO 2/23/2024    _____________________________________________________________

## 2024-02-24 NOTE — SIGNIFICANT EVENT
Unable to interview the patient due to patient currently being sedated from the morning Zyprexa that she received following agitated behavior.  She was admitted last night and I did review her chart.  Started around Invega 6 mg tonight    Will do a complete psychiatry evaluation tomorrow      02/24/24 at 12:12 PM - Lauro Lynch MD

## 2024-02-24 NOTE — NURSING NOTE
Medicated with IM zyprexa with two security at standby, pt yelling at staff and peers. Pt taking clothing off. Pt internally stimulated, responding to voices, smacking self. Allowed shot to be given. Pt now in dayroom eating, going over menu with staff.

## 2024-02-24 NOTE — NURSING NOTE
Refusing invega and hydralazine for BP. Pt states  that she does not take meds/drugs. Pt awoke for lunch and continues to be internally stimulated, talking loudly to self, interrupting peers, getting into their personal space at times but not touching them. Pt appears to be knocking over drinks on purpose.  Paranoid about the rovers(phone devices)that checks are done on, thinks that people are recording her. Pt did come to desk and ask to use phone, cursing at whoever she was talking to. Pt now in quiet room on phone.

## 2024-02-25 LAB
ATRIAL RATE: 95 BPM
P AXIS: 32 DEGREES
P OFFSET: 195 MS
P ONSET: 145 MS
PR INTERVAL: 156 MS
Q ONSET: 223 MS
QRS COUNT: 16 BEATS
QRS DURATION: 76 MS
QT INTERVAL: 352 MS
QTC CALCULATION(BAZETT): 442 MS
QTC FREDERICIA: 410 MS
R AXIS: 22 DEGREES
T AXIS: 28 DEGREES
T OFFSET: 399 MS
VENTRICULAR RATE: 95 BPM

## 2024-02-25 PROCEDURE — 1240000001 HC SEMI-PRIVATE BH ROOM DAILY

## 2024-02-25 PROCEDURE — 97165 OT EVAL LOW COMPLEX 30 MIN: CPT | Mod: GO

## 2024-02-25 PROCEDURE — 97530 THERAPEUTIC ACTIVITIES: CPT | Mod: GO

## 2024-02-25 PROCEDURE — 99222 1ST HOSP IP/OBS MODERATE 55: CPT | Performed by: PSYCHIATRY & NEUROLOGY

## 2024-02-25 RX ORDER — ARIPIPRAZOLE 5 MG/1
5 TABLET ORAL DAILY
Status: DISCONTINUED | OUTPATIENT
Start: 2024-02-25 | End: 2024-02-26

## 2024-02-25 ASSESSMENT — PAIN SCALES - GENERAL
PAINLEVEL_OUTOF10: 0 - NO PAIN
PAINLEVEL_OUTOF10: 0 - NO PAIN

## 2024-02-25 ASSESSMENT — PAIN - FUNCTIONAL ASSESSMENT
PAIN_FUNCTIONAL_ASSESSMENT: 0-10
PAIN_FUNCTIONAL_ASSESSMENT: 0-10

## 2024-02-25 ASSESSMENT — COLUMBIA-SUICIDE SEVERITY RATING SCALE - C-SSRS
1. SINCE LAST CONTACT, HAVE YOU WISHED YOU WERE DEAD OR WISHED YOU COULD GO TO SLEEP AND NOT WAKE UP?: NO
6. HAVE YOU EVER DONE ANYTHING, STARTED TO DO ANYTHING, OR PREPARED TO DO ANYTHING TO END YOUR LIFE?: NO
2. HAVE YOU ACTUALLY HAD ANY THOUGHTS OF KILLING YOURSELF?: NO

## 2024-02-25 NOTE — CARE PLAN
Problem: Sensory Perceptual Alteration as Evidenced by  Goal: Discusses signs/symptoms of illness/treatment options  Outcome: Progressing     Problem: Sensory Perceptual Alteration as Evidenced by  Goal: Verbalizes reduction in hallucinations/delusions  Outcome: Progressing     Problem: Sensory Perceptual Alteration as Evidenced by  Goal: Free from restraint events  Outcome: Progressing     Problem: Self Care Deficit  Goal: STG - Patient completes hygiene  Outcome: Progressing   The patient's goals for the shift include go home per pt    The clinical goals for the shift include pt will take abilify she asked dr to switch her to    Over the shift, the patient did not make progress toward the following goals of taking scheduled meds. Barriers to progression include not thinking she needs meds, remaining psychotic. Recommendations to address these barriers include continued hospitalization and med review.     Pt sleeping at intervals. Pt remains internally stimulated. Talking to self at times and religiously preoccupied. Pt continues to not respect personal space but not as bad as yesterday. Pt stated to psychiatrrist that she would not take invega but would take abilify. Pt continued to refuse abilify and was asked throughout the day. Pt did shower today, was up for snacks, no yelling out or trying to touch or interfere with peers. Pt denied suicidal thoughts, denied homicidal thoughts.

## 2024-02-25 NOTE — PROGRESS NOTES
Occupational Therapy     [facility] Occupational Therapy Assessment & Treatment    Patient Name: Aida Butts  MRN: [id number]  Today's Date: 2/25/2024      Activity:  Self-Esteem: Positive Psychology Approaches    Attendance:  Attendance  Activity: Discussion/reminisce  Participation: Active participation    Treatment Approach  Approach : Group therapy sessions, 30 to 45 minutes (Pt frequently in & out of room, participated in approx. 15 min of group)  Patient Stated Goals: none stated  Cognition: Attention, Directions (Pt alert, Ox2, easily distracted. Demonstrates limited understanding & disorganized thinking.)  Social Skills: Interacts independently in social activity, Atypical behaviors & mannerisms are observed (Interrupts at times, rather tangential, religiously preoccupied)  Emotional: Mood (Pt mood somewhat labile, affect constricted)  Treatment Approach Comments: Pt attended & participated in afternoon therapy group focused on self-esteem through positive psychology approaches. Pt given handout of various character strengths & asked to select 3 they feel they exemplify. Pt selected & shared w/ the group: wisdom, love, gratitude. Pt engaged in discussion about ways she exemplifies these traits. Finally, pt given handout titled, “Strengths Use Plan”, which provided instructions on developing a plan for intentionally using strengths. Pt asked to find at least 1 thing they can do this week to demonstrate each strength they selected. Pt left room before completing this activity.  Pt demonstrates limited understanding due to disorganized thinking & poor insight; would benefit from reinforced education.      Encounter Problems       Encounter Problems (Active)       OT Goals       Pt will explore, identify, and appropriately utilize effective coping strategies to cope with daily stressors and manage emotions with independence prior to discharge.        Start:  02/25/24    Expected End:  03/24/24            Pt will  demonstrate ability to appropriately modulate emotions and impulses with independence prior to discharge.        Start:  02/25/24    Expected End:  03/24/24            Pt will explore, identify, and appropriately utilize effective communication strategies to express feelings, wants, and needs with independence prior to discharge.        Start:  02/25/24    Expected End:  03/24/24                   Education:  Pt provided educational handouts on self-esteem through positive psychology. Reviewed, discussed, & completed “Strengths Use Plan”. Pt demonstrates limited understanding & would benefit from reinforced education.

## 2024-02-25 NOTE — PROGRESS NOTES
Occupational Therapy    OT Behavioral Health Evaluation    Patient Name: Aida Butts  MRN: [id number]  Today's Date: 2/25/2024       OT Intervention Plan:  Skilled OT interventions to include: therapeutic use of self, therapeutic use of occupations and activities, therapeutic groups (including task skill groups, life skill groups, coping skill groups, anger/grief management groups, and ADL/IADL groups), and 1:1 sessions focused on individualized goals for mental health management to improve ADL/IADL performance.      Subjective   Current Problem:  No diagnosis found.  General:  General  Reason for Referral: manic behavior impairing ADLs & IADLs  Referred By: Dr. Lynch  Past Medical History Relevant to Rehab: bipolar disorder, unspecified psychosis, most recent psychiatric hospitalization in March 2023  Prior to Session Communication: Bedside nurse  Patient Position Received: Up in chair  General Comment: 37 y/o female presents to ED after her daughter called EMS reporting pt is off medications & exhibiting manic, disorganized behavior. Upon arrival to ED, pt demonstrated symptoms of psychosis - appearing internally stimulated, rapid & pressured speech, religiously preoccupied. Pt has poor insight & does not feel she needs to be here. At time of assessment, pt denies SI & HI.    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 Attends [employer] & active in [employer] community.     Sources of Support:    friends, sisters, [employer] community, God    Prior Level of Function/Living Situation:    Activities of Daily Living/Self Care  Living Situation: lives alone  Hygiene/Grooming: independence  Bathing: independence  Dressing: independence  Toileting: independence  Continence: independence  Feeding: independence  Functional Mobility: independence  Medication Use/Follows Medical Advice: noncompliant - Per daughter's report  ADL Comment:      Instrumental Activities of Daily  "Living  Parenting/: N/a as pt's children are grown  Driving/Community Mobility: WFL - says she no longer has a car, typically walks or takes bus  Financial Management: impaired  Physical Self-care : WFL  Emotional Self-care: impaired  Home Care/Chores: impaired  Cooking: impaired  Safety Judgement: impaired  Rest/Sleep: impaired  Education:   Associate's degree or other certification  Work/Volunteering:   full time as a nurse, says she loves her job   IADL/Daily Routine Comment:   impaired by pt's current disorganized thinking     Social Participation/Community Involvement:  Socializing: Pt reports having good friendships, being close w/ her sisters, having a [employer] community  Balanced Relationships:  continue to assess    Emotional Regulation Skills:  Emotional Expression:  Affect: constricted  Speech: rapid speech and pressured speech  Mood/Impulse Modulation: labile, poor depression management, and poor grief management  Coping Skills:  Helpful: positive self-talk and social support (including support groups)  Harmful: medication noncompliance    Cognitive & Task Performance Skills:  Orientation: person and place  Attention Span: easily distracted  Problem-solving: impaired  Decision-making: impaired  Thought Content: delusions and paranoia  Thought Processes: tangential and disorganized  Organizational Skills: thought processes are disorganized and displays flight of ideas  Short Term Memory: impaired  Remote Memory: [facility]  Safety Judgement: impaired  Perseveration:  present - fixated on \"whoever called to get me in here\"  Insight/Judgement:  impaired    Communication and Interpersonal Skills:  Boundaries: impaired  Appropriate Disclosure: impaired  Assertion: impaired   Communication/Interpersonal Comment: Pt's interpersonal skills currently impaired by disorganized thinking & manic behavior.     Goals:   Encounter Problems       Encounter Problems (Active)       OT Goals       Pt will explore, " identify, and appropriately utilize effective coping strategies to cope with daily stressors and manage emotions with independence prior to discharge.        Start:  02/25/24    Expected End:  03/24/24            Pt will demonstrate ability to appropriately modulate emotions and impulses with independence prior to discharge.        Start:  02/25/24    Expected End:  03/24/24            Pt will explore, identify, and appropriately utilize effective communication strategies to express feelings, wants, and needs with independence prior to discharge.        Start:  02/25/24    Expected End:  03/24/24                 Education:  Pt provided overview of stay on inpatient psychiatric unit, including general expectations, occupational therapy's role, and interdisciplinary approach to care. Pt verbalized understanding.
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
## 2024-02-25 NOTE — CARE PLAN
The patient's goals for the shift include Unable to determine d/t pt mental status    The clinical goals for the shift include Pt will sleep > 6 hours tonight.    Pt continues to exhibit signs of esteban and psychosis.  Pt speech remains rapid and pressured.  Pt thoughts remains disorganized and delusional.  Flight of ideas noted.  Pt has at times exhibited intrusive, labile and agitated behaviors.  Pt has poor boundaries with regard to personal space of others.  Pt continues to refuse scheduled medications.  In general, pt has been easy to redirect.  At one point during the shift pt began showing signs of verbal aggression and physical posturing towards staff.  Pt given IM ativan and haldol without incident.  Security on unit for show of support.  Pt currently in bed sleeping.    Pt appeared to sleep throughout the night.    Problem: Sensory Perceptual Alteration as Evidenced by  Goal: Patient/Family participate in treatment and discharge plans  Outcome: Progressing  Goal: Patient/Family verbalizes awareness of resources  Outcome: Progressing  Goal: Participates in unit activities  Outcome: Progressing  Goal: Discusses signs/symptoms of illness/treatment options  Outcome: Progressing  Goal: Initiates reality-based interactions  Outcome: Progressing  Goal: Able to discuss content of hallucinations/delusions  Outcome: Progressing  Goal: Notifies staff when experiencing hallucinations/delusions  Outcome: Progressing  Goal: Verbalizes reduction in hallucinations/delusions  Outcome: Progressing  Goal: Will not act on psychotic perception  Outcome: Progressing  Goal: Understands least restrictive measures  Outcome: Progressing  Goal: Free from restraint events  Outcome: Progressing     Problem: Altered Thought Processes as Evidenced by  Goal: STG - Desires improvement in ability to think and concentrate  Outcome: Progressing     Problem: Alteration in Sleep  Goal: STG - Reports nightly sleep, duration, and  quality  Outcome: Progressing  Goal: STG - Identifies sleep hygiene aids  Outcome: Progressing  Goal: STG - Informs staff if unable to sleep  Outcome: Progressing     Problem: Anxiety  Goal: Attempts to manage anxiety with help  Outcome: Progressing  Goal: Verbalizes ways to manage anxiety  Outcome: Progressing  Goal: Implements measures to reduce anxiety  Outcome: Progressing     Problem: Self Care Deficit  Goal: STG - Patient completes hygiene  Outcome: Progressing  Goal: Accepts need for medications  Outcome: Progressing     Problem: Defensive Coping  Goal: Identifies appropriate social interaction  Outcome: Progressing  Goal: Demonstrates appropriate social interactions  Outcome: Progressing        no known allergies
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
## 2024-02-25 NOTE — PROGRESS NOTES
Occupational Therapy     [facility] Occupational Therapy Assessment & Treatment    Patient Name: Aida Butts  MRN: [id number]  Today's Date: 2/25/2024      Activity:  Self-Esteem & Core Beliefs Group    Attendance:  Attendance  Activity: Discussion/reminisce  Participation: Active participation    Treatment Approach  Approach : Group therapy sessions, 30 to 45 minutes (Pt frequently in & out of room, participated for approx. 15 min of group)  Patient Stated Goals: none stated  Cognition: Attention, Directions (Pt alert, Ox2, easily distracted. Demonstrates limited understanding & disorganized thinking.)  Social Skills: Interacts independently in social activity, Atypical behaviors & mannerisms are observed (Interrupts at times, rather tangential, religiously preoccupied)  Emotional: Mood (Pt mood somewhat labile, affect constricted)  Treatment Approach Comments: Pt attended & participated in morning therapy group focused on self-esteem & core beliefs. Pt first given self-esteem self-assessment, which pt was encouraged to complete. Pt completed. Educated pt on using results of this self-assessment to understand where their self-esteem needs the most improvement. Then, led pt through a visualization exercise which required imagining their “best self” & writing down some key components of their best self.  After activity, pt given educational handouts on self-esteem, self-acceptance, & core beliefs. Reviewed & discussed information. Pt participated in discussion by responding to question about the consequences of positive vs. negative self-esteem - however, pt's response was off-topic & disorganized. Finally, pt participated in activity that involved challenging a negative core belief. Pt completed the handout, demonstrating limited understanding in responses. At group’s conclusion, pt participated in discussion about self-esteem’s impact on our daily functioning in meaningful roles & occupations. Pt demonstrates limited  understanding due to disorganized thinking & poor insight. Pt would benefit from reinforced education.      Encounter Problems       Encounter Problems (Active)       OT Goals       Pt will explore, identify, and appropriately utilize effective coping strategies to cope with daily stressors and manage emotions with independence prior to discharge.        Start:  02/25/24    Expected End:  03/24/24            Pt will demonstrate ability to appropriately modulate emotions and impulses with independence prior to discharge.        Start:  02/25/24    Expected End:  03/24/24            Pt will explore, identify, and appropriately utilize effective communication strategies to express feelings, wants, and needs with independence prior to discharge.        Start:  02/25/24    Expected End:  03/24/24                   Education:  Pt provided educational handouts on self-esteem & core beliefs. Reviewed & discussed. Pt demonstrates limited understanding & would benefit from reinforced education.

## 2024-02-26 PROCEDURE — 97530 THERAPEUTIC ACTIVITIES: CPT | Mod: GO

## 2024-02-26 PROCEDURE — 1240000001 HC SEMI-PRIVATE BH ROOM DAILY

## 2024-02-26 PROCEDURE — 2500000001 HC RX 250 WO HCPCS SELF ADMINISTERED DRUGS (ALT 637 FOR MEDICARE OP): Performed by: PSYCHIATRY & NEUROLOGY

## 2024-02-26 PROCEDURE — 99232 SBSQ HOSP IP/OBS MODERATE 35: CPT | Performed by: PSYCHIATRY & NEUROLOGY

## 2024-02-26 PROCEDURE — 97150 GROUP THERAPEUTIC PROCEDURES: CPT | Mod: GO

## 2024-02-26 RX ORDER — ARIPIPRAZOLE 10 MG/1
10 TABLET ORAL DAILY
Status: DISCONTINUED | OUTPATIENT
Start: 2024-02-27 | End: 2024-02-28 | Stop reason: HOSPADM

## 2024-02-26 RX ADMIN — MAGNESIUM HYDROXIDE 10 ML: 2400 SUSPENSION ORAL at 03:35

## 2024-02-26 RX ADMIN — ARIPIPRAZOLE 5 MG: 5 TABLET ORAL at 08:30

## 2024-02-26 SDOH — ECONOMIC STABILITY: HOUSING INSECURITY: FEELS SAFE LIVING IN HOME: YES

## 2024-02-26 SDOH — ECONOMIC STABILITY: GENERAL

## 2024-02-26 ASSESSMENT — PAIN - FUNCTIONAL ASSESSMENT
PAIN_FUNCTIONAL_ASSESSMENT: 0-10
PAIN_FUNCTIONAL_ASSESSMENT: 0-10

## 2024-02-26 ASSESSMENT — PAIN SCALES - GENERAL
PAINLEVEL_OUTOF10: 0 - NO PAIN
PAINLEVEL_OUTOF10: 0 - NO PAIN

## 2024-02-26 ASSESSMENT — LIFESTYLE VARIABLES: SUBSTANCE_ABUSE_PAST_12_MONTHS: YES

## 2024-02-26 NOTE — PROGRESS NOTES
"Aida Butts is a 38 y.o. female on day 2 of admission presenting with schizoaffective disorder, bipolar type.    Subjective   Patient less manic today, has been calm and cooperative with staff, less irritable.  Patient reports that she slept well last night.  Patient tolerating Abilify with no adverse effects.  We will increase Abilify to 10 mg oral daily today.  Patient agreeable to plan.    Objective     MSE  General: Appropriately groomed and dressed.  Appearance: Patient wearing redundant clothing  Attitude: Calm, cooperative.  Behavior: Appropriate eye contact.  Motor activity: No agitation or retardation. no EPS.  Normal gait.  Speech: Regular rate, rhythm, volume and tone.  Mood: Less irritable  Affect: Appropriate range  Thought process: Organized, linear, goal-directed.  Associations are logical.  Thought content: Does not endorse suicidal or homicidal ideation, delusions.  Thought perception: Did not endorse auditory or visual hallucinations.  Cognition: Alert, oriented x3.  no deficit in memory or attention.  Insight: Fair.  Judgment: Fair.    Last Recorded Vitals  /74   Pulse 102   Temp 36.1 °C (97 °F)   Resp 16   Ht 1.702 m (5' 7\")   Wt 92.7 kg (204 lb 5.9 oz)   SpO2 99%   BMI 32.01 kg/m²      Relevant Results  Scheduled medications  [START ON 2/27/2024] ARIPiprazole, 10 mg, oral, Daily      Continuous medications     PRN medications  PRN medications: acetaminophen, alum-mag hydroxide-simeth, diphenhydrAMINE **OR** diphenhydrAMINE, haloperidol, haloperidol lactate, hydrALAZINE, hydrOXYzine HCL, ibuprofen, LORazepam, LORazepam, magnesium hydroxide, traZODone    No results found for this or any previous visit (from the past 24 hour(s)).     Assessment/Plan   Diagnosis:  Schizoaffective disorder, bipolar type    Impression:     Labs and Chart: reviewed  Case discussed with treatment team members  Encouraged patient to attend group and other mileu activity  Collateral from family - " pending  Discharge planing  Medication:       - Reviewed. To continue as ordered    Medication Consent  Medication Consent: risks, benefits, side effects reviewed for all ordered meds and patient expressed understanding and consent obtained    EDU Turner-CNP

## 2024-02-26 NOTE — PROGRESS NOTES
"Occupational Therapy    [facility] Occupational Therapy Assessment & Treatment    Patient Name: Aida Butts  MRN: [id number]  Today's Date: 2/26/2024      Activity:  Coping Game Group    Attendance:  Attendance  Activity: Discussion/reminisce, Games  Participation: Active participation    Treatment Approach  Approach : Group therapy sessions, 30 to 45 minutes  Patient Stated Goals: none stated  Cognition: Attention, Directions (Pt alert, oriented, & attentive. Follows simple multi-step directions independently.)  Social Skills: Interacts independently in social activity  Emotional: Mood (Pt mood calm, euthymic; affect more animated than yesterday)  Stress Management/Relaxation Training: Identifies difficulty adjusting to illness, hospitalization, or pain, Utilizes stress management/relaxation techniques  Treatment Approach Comments: Pt attended & participated in afternoon therapy group focused on positive coping skills. Discussion of positive coping facilitated by playing The [place] Game board game. Pt took turns w/ peers rolling dice & answering questions about mental health management to advance across a board. Pt gave responses to questions such as, \"What's something you can do to take a break?\". Pt able to answer most questions appropriately & accurately w/ min. VCs. Pt demonstrates emerging understanding of positive coping, though thinking is still somewhat disorganized. Pt would benefit from continued education.      Encounter Problems       Encounter Problems (Active)       OT Goals       Pt will explore, identify, and appropriately utilize effective coping strategies to cope with daily stressors and manage emotions with independence prior to discharge.  (Progressing)       Start:  02/25/24    Expected End:  03/24/24            Pt will demonstrate ability to appropriately modulate emotions and impulses with independence prior to discharge.  (Progressing)       Start:  02/25/24    Expected End:  03/24/24       "      Pt will explore, identify, and appropriately utilize effective communication strategies to express feelings, wants, and needs with independence prior to discharge.  (Progressing)       Start:  02/25/24    Expected End:  03/24/24                 Education:  Pt provided education on positive coping throughout game. Pt demonstrates emerging-fair understanding.

## 2024-02-26 NOTE — PROGRESS NOTES
Occupational Therapy     [facility] Occupational Therapy Assessment & Treatment    Patient Name: Aida Butts  MRN: [id number]  Today's Date: 2/26/2024      Activity:  Assertive Communication & Anger Management Group    Attendance:  Attendance  Activity: Discussion/reminisce  Participation: Active participation    Treatment Approach  Approach : Group therapy sessions, 30 to 45 minutes  Patient Stated Goals: none stated  Cognition: Attention, Directions (Pt alert, oriented x3. Somewhat easily distractable & can be tangential. More redirectable than yesterday.)  Social Skills: Interacts with others with cues, Atypical behaviors & mannerisms are observed (often tangential)  Emotional: Mood (Pt mood somewhat labile, affect constricted)  Treatment Approach Comments: Pt attended & participated in approx. 30 min. of morning therapy group focused on assertive communication & anger management. Pt w/ doctor at beginning of group & came in late. Pt educated on different communication styles including passive, aggressive, passive-aggressive, & assertive. Pt participated in discussion about the ways various communication styles can positively or negatively impact engagement in meaningful roles, relationships, & occupations requiring communication such as work. Then, pt given educational handouts w/ tips for assertive communication, including “I” statements. Reviewed & discussed. Pt participated in discussion by proposing alternate “I” statements when given example “you” statements. Pt's contributions to discussion were tangential & often off-topic, demonstrating limited understanding. However, pt does make connections between topic & personal experience that are more organized than in previous groups. Finally, pt given handout of different anger management strategies to use when assertive communication is not effective. Reviewed & discussed. Finally, pt given blank paper & instructed to trace their hand then write “Things Outside My  Control” outside the hand & “Things I Can Control” inside the hand. Proceeded to discuss stressors that are outside our control & factors that are in our control. Pt contributed to discussion by suggesting how other peoples' behavior is out of our control. Explained to pts the benefit of focusing on factors within our locus of control when stressors outside our control are impacting us. Pt demonstrates limited understanding due to disorganized thinking. However pt appears to be progressing as she was more organized & easier to redirect this group. Pt would benefit from reinforced education.      Encounter Problems       Encounter Problems (Active)       OT Goals       Pt will explore, identify, and appropriately utilize effective coping strategies to cope with daily stressors and manage emotions with independence prior to discharge.  (Progressing)       Start:  02/25/24    Expected End:  03/24/24            Pt will demonstrate ability to appropriately modulate emotions and impulses with independence prior to discharge.  (Progressing)       Start:  02/25/24    Expected End:  03/24/24            Pt will explore, identify, and appropriately utilize effective communication strategies to express feelings, wants, and needs with independence prior to discharge.  (Progressing)       Start:  02/25/24    Expected End:  03/24/24                 Education:   Pt given educational handouts on assertive communication & anger management. Reviewed & discussed. Pt demonstrates limited understanding due to disorganized thinking; would benefit from reinforced education.
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
## 2024-02-26 NOTE — CARE PLAN
"The patient's goals for the shift include \"I'm just waiting to go home.\"    The clinical goals for the shift include Pt will sleep > 6 hours tonight.    Pt mood, cognition, behaviors are improving.  Pt is less energetic and more directable.  Pt thoughts are coherent and logical.  Pt speech is less pressured.  Pt reports she's feeling good.  Pt has been visible on unit and social with peers.  Pt denies SI, HI, and A/V hallucinations.  Pt affect is appropriate.  Pt has no scheduled medications.    Pt was up at times throughout the night, but appeared to sleep 4 - 5 hours.    Problem: Sensory Perceptual Alteration as Evidenced by  Goal: Discusses signs/symptoms of illness/treatment options  Outcome: Progressing  Goal: Able to discuss content of hallucinations/delusions  Outcome: Progressing  Goal: Notifies staff when experiencing hallucinations/delusions  Outcome: Progressing  Goal: Verbalizes reduction in hallucinations/delusions  Outcome: Progressing  Goal: Will not act on psychotic perception  Outcome: Progressing  Goal: Understands least restrictive measures  Outcome: Progressing  Goal: Free from restraint events  Outcome: Progressing     Problem: Altered Thought Processes as Evidenced by  Goal: STG - Desires improvement in ability to think and concentrate  Outcome: Progressing     Problem: Alteration in Sleep  Goal: STG - Reports nightly sleep, duration, and quality  Outcome: Progressing  Goal: STG - Identifies sleep hygiene aids  Outcome: Progressing  Goal: STG - Informs staff if unable to sleep  Outcome: Progressing     Problem: Anxiety  Goal: Attempts to manage anxiety with help  Outcome: Progressing  Goal: Verbalizes ways to manage anxiety  Outcome: Progressing  Goal: Implements measures to reduce anxiety  Outcome: Progressing     Problem: Self Care Deficit  Goal: STG - Patient completes hygiene  Outcome: Progressing  Goal: Accepts need for medications  Outcome: Progressing     Problem: Defensive Coping  Goal: " Identifies appropriate social interaction  Outcome: Progressing  Goal: Demonstrates appropriate social interactions  Outcome: Progressing

## 2024-02-26 NOTE — CARE PLAN
"The patient's goals for the shift include Patient would like to be discharged.    The clinical goals for the shift include Patient will be in control of behavior this shift.    Patient denies SI, HI, A/VH. Administered medication per order. Patient took the medication, bit the medication in half and began chewing it. Patient stated, \"I will just take half\". Patient educated on medication and informed that it would not be therapeutic dose. Patient then took the remainder of medication. Patient observed to be out on unit, inappropriately interacting with peers by being invasive.      Problem: Sensory Perceptual Alteration as Evidenced by  Goal: Patient/Family participate in treatment and discharge plans  Outcome: Progressing  Goal: Patient/Family verbalizes awareness of resources  Outcome: Progressing  Goal: Participates in unit activities  Outcome: Progressing  Goal: Discusses signs/symptoms of illness/treatment options  Outcome: Progressing  Goal: Able to discuss content of hallucinations/delusions  Outcome: Progressing  Goal: Notifies staff when experiencing hallucinations/delusions  Outcome: Progressing  Goal: Verbalizes reduction in hallucinations/delusions  Outcome: Progressing  Goal: Will not act on psychotic perception  Outcome: Progressing  Goal: Understands least restrictive measures  Outcome: Progressing  Goal: Free from restraint events  Outcome: Progressing     Problem: Altered Thought Processes as Evidenced by  Goal: STG - Desires improvement in ability to think and concentrate  Outcome: Progressing     Problem: Alteration in Sleep  Goal: STG - Reports nightly sleep, duration, and quality  Outcome: Progressing  Goal: STG - Identifies sleep hygiene aids  Outcome: Progressing  Goal: STG - Informs staff if unable to sleep  Outcome: Progressing     Problem: Anxiety  Goal: Attempts to manage anxiety with help  Outcome: Progressing  Goal: Verbalizes ways to manage anxiety  Outcome: Progressing  Goal: " Implements measures to reduce anxiety  Outcome: Progressing     Problem: Self Care Deficit  Goal: STG - Patient completes hygiene  Outcome: Progressing  Goal: Accepts need for medications  Outcome: Progressing     Problem: Defensive Coping  Goal: Identifies appropriate social interaction  Outcome: Progressing  Goal: Demonstrates appropriate social interactions  Outcome: Progressing

## 2024-02-27 PROCEDURE — 99232 SBSQ HOSP IP/OBS MODERATE 35: CPT | Performed by: PSYCHIATRY & NEUROLOGY

## 2024-02-27 PROCEDURE — 97530 THERAPEUTIC ACTIVITIES: CPT | Mod: GO

## 2024-02-27 PROCEDURE — 1140000001 HC PRIVATE PSYCH ROOM DAILY

## 2024-02-27 PROCEDURE — 2500000001 HC RX 250 WO HCPCS SELF ADMINISTERED DRUGS (ALT 637 FOR MEDICARE OP): Performed by: PSYCHIATRY & NEUROLOGY

## 2024-02-27 PROCEDURE — 97150 GROUP THERAPEUTIC PROCEDURES: CPT | Mod: GO

## 2024-02-27 RX ADMIN — ACETAMINOPHEN 650 MG: 325 TABLET ORAL at 01:26

## 2024-02-27 RX ADMIN — ARIPIPRAZOLE 10 MG: 10 TABLET ORAL at 09:27

## 2024-02-27 ASSESSMENT — PAIN DESCRIPTION - ORIENTATION: ORIENTATION: MID;LOWER

## 2024-02-27 ASSESSMENT — PAIN - FUNCTIONAL ASSESSMENT
PAIN_FUNCTIONAL_ASSESSMENT: 0-10

## 2024-02-27 ASSESSMENT — PAIN SCALES - GENERAL
PAINLEVEL_OUTOF10: 0 - NO PAIN
PAINLEVEL_OUTOF10: 5 - MODERATE PAIN
PAINLEVEL_OUTOF10: 0 - NO PAIN

## 2024-02-27 ASSESSMENT — PAIN DESCRIPTION - LOCATION: LOCATION: PELVIS

## 2024-02-27 NOTE — PROGRESS NOTES
"Aida Butts is a 38 y.o. female on day 3 of admission presenting with schizoaffective disorder, bipolar type.    Subjective   Patient in a pleasant mood today, nursing staff reports that she still had some difficulty with sleep.  Patient active on unit, walking the halls, socializing with staff and peers.  Patient thoughts still disorganized at times, but improving.  Patient tolerating Abilify well with no adverse effects.    Objective     MSE  General: Appropriately groomed and dressed.  Appearance: Appears stated age.  Attitude: Calm, cooperative.  Behavior: Appropriate eye contact.  Motor activity: No agitation or retardation. no EPS.  Normal gait.  Speech: Regular rate, rhythm, volume and tone.  Mood: \"Fine \"  Affect: Appropriate range  Thought process: Disorganized at times, but improving   Thought content: Does not endorse suicidal or homicidal ideation, no delusions elicited.  Thought perception: Did not endorse auditory or visual hallucinations.  Cognition: Alert, oriented x3.  no deficit in memory or attention.  Insight: Fair.  Judgment: Fair.    Last Recorded Vitals  /86   Pulse 107   Temp 36.6 °C (97.9 °F)   Resp 18   Ht 1.702 m (5' 7\")   Wt 92.7 kg (204 lb 5.9 oz)   SpO2 98%   BMI 32.01 kg/m²      Relevant Results  Scheduled medications  ARIPiprazole, 10 mg, oral, Daily      Continuous medications     PRN medications  PRN medications: acetaminophen, alum-mag hydroxide-simeth, diphenhydrAMINE **OR** diphenhydrAMINE, haloperidol, haloperidol lactate, hydrALAZINE, hydrOXYzine HCL, ibuprofen, LORazepam, LORazepam, magnesium hydroxide, traZODone    No results found for this or any previous visit (from the past 24 hour(s)).     Assessment/Plan   Diagnosis:  Schizoaffective disorder, bipolar type    Impression:     Labs and Chart: reviewed  Case discussed with treatment team members  Encouraged patient to attend group and other mileu activity  Collateral from family - pending  Discharge " planing  Medication:       - Reviewed. To continue as ordered    Medication Consent  Medication Consent: risks, benefits, side effects reviewed for all ordered meds and patient expressed understanding and consent obtained    EDU Turner-CNP

## 2024-02-27 NOTE — CARE PLAN
"  Problem: Sensory Perceptual Alteration as Evidenced by  Goal: Patient/Family participate in treatment and discharge plans  Outcome: Progressing  Goal: Patient/Family verbalizes awareness of resources  Outcome: Progressing  Goal: Participates in unit activities  Outcome: Progressing  Goal: Discusses signs/symptoms of illness/treatment options  Outcome: Progressing  Goal: Able to discuss content of hallucinations/delusions  Outcome: Progressing  Goal: Notifies staff when experiencing hallucinations/delusions  Outcome: Progressing  Goal: Verbalizes reduction in hallucinations/delusions  Outcome: Progressing  Goal: Will not act on psychotic perception  Outcome: Progressing  Goal: Understands least restrictive measures  Outcome: Progressing  Goal: Free from restraint events  Outcome: Progressing   The patient's goals for the shift include sleep    The clinical goals for the shift include sleep    Over the shift, the patient did not make progress toward the following goals. Barriers to progression include . Recommendations to address these barriers include .    Pt less labile and irritable this shift. Pt remains somewhat disorganized but is directable. Pt ate snack and was out and social on unit this shift. Pt observed to have poor sleep throughout night. Pt awake and up to nurses station with multiple requests. Pt also observed to be tearful at times, stated \"I just had to do it\" but would not elaborate further. Pt also appeared internally stimulated with tangential speech.     "

## 2024-02-27 NOTE — DISCHARGE INSTR - APPOINTMENTS
Mariella Lowe with [facility]  03/01/2024 at 11am  Telehealth Appt  [phone]  Please answer all private and unknown calls on the day of the appt.  Call them if they do not reach you by [phone].     For future transportation needs   to your appointments (when in person)  You can contact [facility] 2 business days prior to an appt.  [phone] to enroll in transportation benefit  [phone] to arrange a ride

## 2024-02-27 NOTE — PROGRESS NOTES
"Occupational Therapy     REHAB Therapy & Treatment    Patient Name: Aida Butts  MRN: [id number]  Today's Date: 2/27/2024      Group Activity  Attendance:  Attendance  Activity: Discussion/reminisce, One-to-one (Relaxation skills group/review info on Diagnosis & relapse prevention)  Participation: Active participation  1:1 Therapeutic Activty  Treatment Approach  Approach : 15 to 25 minutes, 30 to 45 minutes, 1 to1 Therapy sessions, Group therapy sessions  Patient Stated Goals: \"I'm going to stick with her(psychiatrist), but I need transportation,\" per Pt.(OT passed along transportation issues to )  Cognition: Attention, Directions, Orientation (Pt with disorganized, tangential thoughts, initially denying her mental health issues while easily distracted, off topic & in/out of group)  Social Skills: Interacts independently in social activity  Emotional: Behaviors, Mood (Labile mood w/Pt tearful at times while noting her sadness that her mom needs Pt's care then smiling and laughing at mondane issues the moment(soap/foam dispenser))  Stress Management/Relaxation Training: Performs stress management/relaxation techniques (In group, Pt was able to focus for short periods of time(5-7mins) before being distracted, but accepted redirection as needed.)  Treatment Approach Comments: Group: Pt was in and out of her seat, ocasionally talking over others/DVD while others were trying to hear/practivce relaxation skills. Pt did indep recall by name 3 Accupressure skills for increased awareness. In 1:1, OT reviewed Pt's diagnosis w/handout on SChizoaffect Dx/Bipolar to educate Pt on reason for importance in medication compliance. She indep stated her Abilify med as the med she's on and noted herself at the Commitment stage on the Stages Of Disease paper. Pt noted her daughter \"Honesty\" & son Jassi as most supportive pete. after the 2019 death of her 10yo son, Paxton their sibling. 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      Encounter Problems       Encounter Problems (Active)       OT Goals       Pt will explore, identify, and appropriately utilize effective coping strategies to cope with daily stressors and manage emotions with independence prior to discharge.  (Progressing)       Start:  02/25/24    Expected End:  03/24/24            Pt will demonstrate ability to appropriately modulate emotions and impulses with independence prior to discharge.  (Progressing)       Start:  02/25/24    Expected End:  03/24/24            Pt will explore, identify, and appropriately utilize effective communication strategies to express feelings, wants, and needs with independence prior to discharge.  (Progressing)       Start:  02/25/24    Expected End:  03/24/24

## 2024-02-27 NOTE — CARE PLAN
"Pt. Doing better today, has more organized thought process, less agitation.  Pt. Denies having any auditory or visual hallucinations, no signs of internal stimulation observed.  Pt. Has been active on the unit, restless, has not taken any naps today.  Per night shift, pt. Only slept about an hour last night.  Pt. Compliant with medications, denies feeling any negative side effects.          The patient's goals for the shift include \"hopefully get out of here tomrrow\"    The clinical goals for the shift include Pt. will particpate in group therapy meetings    Problem: Sensory Perceptual Alteration as Evidenced by  Goal: Patient/Family participate in treatment and discharge plans  Outcome: Progressing  Goal: Patient/Family verbalizes awareness of resources  Outcome: Progressing  Goal: Participates in unit activities  Outcome: Progressing  Goal: Discusses signs/symptoms of illness/treatment options  Outcome: Progressing  Goal: Able to discuss content of hallucinations/delusions  Outcome: Progressing  Goal: Notifies staff when experiencing hallucinations/delusions  Outcome: Progressing  Goal: Verbalizes reduction in hallucinations/delusions  Outcome: Progressing  Goal: Will not act on psychotic perception  Outcome: Progressing  Goal: Understands least restrictive measures  Outcome: Progressing  Goal: Free from restraint events  Outcome: Progressing     Problem: Altered Thought Processes as Evidenced by  Goal: STG - Desires improvement in ability to think and concentrate  Outcome: Progressing     Problem: Alteration in Sleep  Goal: STG - Reports nightly sleep, duration, and quality  Outcome: Progressing  Goal: STG - Identifies sleep hygiene aids  Outcome: Progressing  Goal: STG - Informs staff if unable to sleep  Outcome: Progressing     Problem: Anxiety  Goal: Attempts to manage anxiety with help  Outcome: Progressing  Goal: Verbalizes ways to manage anxiety  Outcome: Progressing  Goal: Implements measures to reduce " anxiety  Outcome: Progressing     Problem: Self Care Deficit  Goal: STG - Patient completes hygiene  Outcome: Progressing  Goal: Accepts need for medications  Outcome: Progressing     Problem: Defensive Coping  Goal: Identifies appropriate social interaction  Outcome: Progressing  Goal: Demonstrates appropriate social interactions  Outcome: Progressing

## 2024-02-28 ENCOUNTER — PHARMACY VISIT (OUTPATIENT)
Dept: PHARMACY | Facility: CLINIC | Age: 39
End: 2024-02-28
Payer: COMMERCIAL

## 2024-02-28 VITALS
HEART RATE: 133 BPM | DIASTOLIC BLOOD PRESSURE: 82 MMHG | WEIGHT: 204.37 LBS | OXYGEN SATURATION: 96 % | SYSTOLIC BLOOD PRESSURE: 153 MMHG | RESPIRATION RATE: 16 BRPM | HEIGHT: 67 IN | TEMPERATURE: 97 F | BODY MASS INDEX: 32.08 KG/M2

## 2024-02-28 PROCEDURE — 2500000001 HC RX 250 WO HCPCS SELF ADMINISTERED DRUGS (ALT 637 FOR MEDICARE OP): Performed by: PSYCHIATRY & NEUROLOGY

## 2024-02-28 PROCEDURE — RXMED WILLOW AMBULATORY MEDICATION CHARGE

## 2024-02-28 PROCEDURE — 97150 GROUP THERAPEUTIC PROCEDURES: CPT | Mod: GO

## 2024-02-28 PROCEDURE — 99239 HOSP IP/OBS DSCHRG MGMT >30: CPT | Performed by: PSYCHIATRY & NEUROLOGY

## 2024-02-28 RX ORDER — ARIPIPRAZOLE 10 MG/1
10 TABLET ORAL DAILY
Qty: 30 TABLET | Refills: 0 | Status: SHIPPED | OUTPATIENT
Start: 2024-02-29 | End: 2024-03-30

## 2024-02-28 RX ADMIN — ACETAMINOPHEN 650 MG: 325 TABLET ORAL at 05:58

## 2024-02-28 RX ADMIN — ARIPIPRAZOLE 10 MG: 10 TABLET ORAL at 08:36

## 2024-02-28 ASSESSMENT — PAIN DESCRIPTION - ORIENTATION: ORIENTATION: MID;LOWER

## 2024-02-28 ASSESSMENT — PAIN DESCRIPTION - LOCATION: LOCATION: ABDOMEN

## 2024-02-28 ASSESSMENT — PAIN SCALES - GENERAL
PAINLEVEL_OUTOF10: 0 - NO PAIN
PAINLEVEL_OUTOF10: 5 - MODERATE PAIN

## 2024-02-28 ASSESSMENT — PAIN - FUNCTIONAL ASSESSMENT
PAIN_FUNCTIONAL_ASSESSMENT: 0-10
PAIN_FUNCTIONAL_ASSESSMENT: 0-10

## 2024-02-28 NOTE — CARE PLAN
Problem: Sensory Perceptual Alteration as Evidenced by  Goal: Patient/Family participate in treatment and discharge plans  Outcome: Progressing  Goal: Patient/Family verbalizes awareness of resources  Outcome: Progressing  Goal: Participates in unit activities  Outcome: Progressing  Goal: Discusses signs/symptoms of illness/treatment options  Outcome: Progressing  Goal: Able to discuss content of hallucinations/delusions  Outcome: Progressing  Goal: Notifies staff when experiencing hallucinations/delusions  Outcome: Progressing  Goal: Verbalizes reduction in hallucinations/delusions  Outcome: Progressing  Goal: Will not act on psychotic perception  Outcome: Progressing  Goal: Understands least restrictive measures  Outcome: Progressing  Goal: Free from restraint events  Outcome: Progressing   The patient's goals for the shift include sleep    The clinical goals for the shift include sleep    Over the shift, the patient did not make progress toward the following goals. Barriers to progression include . Recommendations to address these barriers include .    Pt attended AA group meeting this evening. Pt also took shower and ate snack. Pt less labile than previous shifts, easily directable. Pt up all night, irritable at times and up to nurses station with multiple requests.

## 2024-02-28 NOTE — DISCHARGE SUMMARY
Discharge Diagnosis:  Schizoaffective disorder, bipolar type    Hospital Course:  Patient is a 38-year-old female with a history of schizoaffective disorder, bipolar type who was admitted to [facility] 5W for psychosis. Due to acutely elevated and imminent risk for self-harm/harm to others, patient required a level of care equivalent to inpatient hospitalization for safety, evaluation, treatment and stabilization.     The patient was admitted to [facility] 5W under the care of Dr. Lynch, restricted to the ortiz and placed on suicide, behavior and elopement precautions.  At the beginning of hospitalization, patient initially presented to the ED with esteban and psychosis.  Patient daughter called EMS stating patient had not been taking her psychiatric medication.  Patient exhibited anxious and elevated mood in the ED.  Patient has pressured speech, flight of ideas, loose associations.  On 5 W. patient initially paranoid, appears internally stimulated.    The treatment team made the following interventions: medication, group/milieu therapy, individual therapy    Over the course of hospitalization, patient reported improvement and objective signs of improvement were noted by staff.  Patient ported improvement in sleep.  Patient initially agitated when in the hospital, not receiving treatment was calm and cooperative, care seeking during her time on 5 W.  Prior to discharge patient denied any paranoia, delusions, auditory and visual hallucinations.  Patient understood the importance of following up with outpatient psychiatric services and maintaining medication compliance.  Patient offered Abilify Maintena LANCE, to help with medication compliance.  She declined states she would rather take the oral medication.    Psychiatric Medications: Abilify 10 mg oral daily.  Patient tolerated medications without side effects.    Prior to the date of discharge, patient was able to contract for safety and  stated they felt safe and appropriate for discharge.  The treatment team found the patient not to be an imminent danger to self or others.  The patient denied suicidal or homicidal ideation and did not endorse auditory and visual hallucinations.  The patient's condition at the time of discharge was stable and initial symptoms improved over the course of hospitalization.    The patient was discharged home under the supervision of family with a 30-day supply of Abilify 10 mg oral daily.    The patient was instructed to call the patient's outpatient provider in the event of worsening symptoms or medication side effects.  Should the patient be unable to maintain their personal safety or the safety of others, instructions were provided to dial 9-1-1 or go to the closest emergency room.    Discharge Mental Status Exam:  General:  Patient is awake, alert, and oriented to person, place, time, and situation.    Appearance:  Appears well-hydrated, well-nourished, and well-groomed and approximately stated age.   Attitude:  Patient was calm and cooperative throughout the interview, which is appropriate to the context of the interview and the topics discussed.   Behavior:  Eye contact is appropriate with topics of discussion.   Motor Activity:  Motor activity is normal. No psychomotor disturbances or abnormal involuntary movements were noted, including psychomotor agitation, psychomotor retardation, involuntary movements, extrapyramidal symptoms, akathisia, or tardive dyskinesia. Gait is normal.   Speech:  Speech is spontaneous, coherent, fluent and of appropriate quantity, rate, volume, and tone and non-vulgar/vulgar.  Speech and mannerisms are consistent with topics of discussion.   Affect:  Euthymic with a full range, mood congruent and appropriate to content.   Thought Process:  Thought process was linear, organized, and goal-directed and devoid of loose associations, flight of ideas, thought blocking or tangents.   Thought  Content:  Thought content was devoid of suicidal ideation or intent, homicidal ideation or intent, self-harm ideation or intent, delusions, illusions, obsessions, or paranoia.   Thought Perception:  Did not endorse auditory or visual hallucinations. Patient did not appear to be internally distracted or preoccupied.   Cognition:  Knowledge and intelligence are believed to be average.  Recent and remote memory, fund of knowledge, and abstract reasoning appear grossly intact, appropriate for age and education, and there are no impairments in attention, concentration, or language.   Insight:  Insight regarding psychiatric conditions is fair.   Judgment:  Judgment is fair.    Recent Labs:  No results found for this or any previous visit (from the past 24 hour(s)).     Risk Assessment at Discharge:  Violence Risk Assessment: major mental illness  Acute Risk of Harm to Others is Considered: low   Risk Mitigated by: Adherence to treatment, strong therapeutic alliance. Follow-up.    Suicide Risk Assessment: current psychiatric illness  Protective Factors against Suicide: hopefulness/future orientation, positive family relationships, sense of responsibility toward family, and social support/connectedness  Acute Risk of Harm to Self is Considered: low  Risk Mitigated by: Adherence to treatment, strong therapeutic alliance. Follow-up.    Burak Pelletier, APRN-CNP

## 2024-02-28 NOTE — CARE PLAN
"Pt. Being discharged today to home via [employer].  Pt. Verbalizes readiness and desire for discharge.  Pt. Has more organized thought process.  Pt. Denies having any auditory or visual hallucinations, no signs of internal stimulation observed.  Pt. Has been  compliant with the Abilify, denies feeling any negative side effects.  Prescription filled with meds to bed program and medication given to pt. at the time of discharge.  Pt. Verbalized understanding of all discharge instructions, medications, and follow up information.  Pt. Has follow up scheduled with Mariella Lowe with [facility] 03/01/2024 at [address] [place] [address] Appt [phone].  All of pt's belongings returned and signed for.      The patient's goals for the shift include \"Hopefully I get to go home today\"    The clinical goals for the shift include Pt. will particpate in group therapy meetings    Problem: Sensory Perceptual Alteration as Evidenced by  Goal: Patient/Family participate in treatment and discharge plans  2/28/2024 1205 by Christiano Rivera RN  Outcome: Met  2/28/2024 1204 by Christiano Rivera RN  Outcome: Progressing  Goal: Patient/Family verbalizes awareness of resources  2/28/2024 1205 by Christiano Rivera RN  Outcome: Met  2/28/2024 1204 by Christiano Rivera RN  Outcome: Progressing  Goal: Participates in unit activities  2/28/2024 1205 by Christiano Rivera RN  Outcome: Met  2/28/2024 1204 by Christiano Rivera RN  Outcome: Progressing  Goal: Discusses signs/symptoms of illness/treatment options  2/28/2024 1205 by Christiano Rivera RN  Outcome: Met  2/28/2024 1204 by Christiano Rivera RN  Outcome: Progressing  Goal: Able to discuss content of hallucinations/delusions  2/28/2024 1205 by Christiano Rivera RN  Outcome: Met  2/28/2024 1204 by Christiano Rivera RN  Outcome: Progressing  Goal: Notifies staff when experiencing hallucinations/delusions  2/28/2024 1205 by Christiano Rivera RN  Outcome: Met  2/28/2024 1204 by Christiano Rivera RN  Outcome: Progressing  Goal: Verbalizes reduction in " hallucinations/delusions  2/28/2024 1205 by Christiano Rivera RN  Outcome: Met  2/28/2024 1204 by Christiano Rivera RN  Outcome: Progressing  Goal: Will not act on psychotic perception  2/28/2024 1205 by Christiano Rivera RN  Outcome: Met  2/28/2024 1204 by Christiano Rivera RN  Outcome: Progressing  Goal: Understands least restrictive measures  2/28/2024 1205 by Christiano Rivera RN  Outcome: Met  2/28/2024 1204 by Christiano iRvera RN  Outcome: Progressing  Goal: Free from restraint events  2/28/2024 1205 by Christiano Rivera RN  Outcome: Met  2/28/2024 1204 by Christiano Rivera RN  Outcome: Progressing     Problem: Altered Thought Processes as Evidenced by  Goal: STG - Desires improvement in ability to think and concentrate  2/28/2024 1205 by Christiano Rivera, OLIMPIA  Outcome: Met  2/28/2024 1204 by Christiano Rivera RN  Outcome: Progressing     Problem: Alteration in Sleep  Goal: STG - Reports nightly sleep, duration, and quality  2/28/2024 1205 by Christiano Rivera, OLIMPIA  Outcome: Met  2/28/2024 1204 by Christiano Rivera RN  Outcome: Progressing  Goal: STG - Identifies sleep hygiene aids  2/28/2024 1205 by Christiano Rivera RN  Outcome: Met  2/28/2024 1204 by Christiano Rivera RN  Outcome: Progressing  Goal: STG - Informs staff if unable to sleep  2/28/2024 1205 by Christiano Rivera RN  Outcome: Met  2/28/2024 1204 by Christiano Rivera RN  Outcome: Progressing     Problem: Anxiety  Goal: Attempts to manage anxiety with help  2/28/2024 1205 by Christiano Rivera, OLIMPIA  Outcome: Met  2/28/2024 1204 by Christiano Rivera RN  Outcome: Progressing  Goal: Verbalizes ways to manage anxiety  2/28/2024 1205 by Christiano Rivera RN  Outcome: Met  2/28/2024 1204 by Christiano Rivera RN  Outcome: Progressing  Goal: Implements measures to reduce anxiety  2/28/2024 1205 by Christiano Rivera RN  Outcome: Met  2/28/2024 1204 by Christiano Rivera, RN  Outcome: Progressing     Problem: Self Care Deficit  Goal: STG - Patient completes hygiene  2/28/2024 1205 by Christiano Miguel, RN  Outcome: Met  2/28/2024 1204 by Christiano Rivera, RN  Outcome: Progressing  Goal: Accepts need for  medications  2/28/2024 1205 by Christiano Rivera RN  Outcome: Met  2/28/2024 1204 by Christiano Rivera RN  Outcome: Progressing     Problem: Defensive Coping  Goal: Identifies appropriate social interaction  2/28/2024 1205 by Christiano Rivera RN  Outcome: Met  2/28/2024 1204 by Christiano Rivera RN  Outcome: Progressing  Goal: Demonstrates appropriate social interactions  2/28/2024 1205 by Christiano Rivera RN  Outcome: Met  2/28/2024 1204 by Christiano Rivera RN  Outcome: Progressing

## 2024-02-28 NOTE — PROGRESS NOTES
"Occupational Therapy     REHAB Occupational Therapy Assessment & Treatment    Patient Name: Aida Butts  MRN: [id number]  Today's Date: 2/28/2024      Activity:  \"Untangle Your Thinking\" Group    Attendance:  Attendance  Activity: Discussion/reminisce  Participation: Active participation    Treatment Approach  Approach : Group therapy sessions, 30 to 45 minutes  Patient Stated Goals: none stated  Cognition: Attention, Directions (Pt alert, Ox3. Easily distracted, internally stimulated. Tangential. Requires min-mod verbal cues to follow complex directions.)  Social Skills: Atypical behaviors & mannerisms are observed (poor listening skills, talking while others are talking, tangential)  Emotional: Mood (Pt mood labile, affect congruent)  Stress Management/Relaxation Training: Identifies difficulty adjusting to illness, hospitalization, or pain  Treatment Approach Comments: Pt attended & participated in morning therapy group focused on “Untangling Your Thinking” by challenging distorted thoughts. Pt given educational handouts on distorted thinking patterns & methods for challenging distorted thinking. Reviewed & discussed. Engaged pts in practice of challenging negative thoughts through distorted thinking role plays. Throughout group, pt frequently requiring redirection. Pt contributions to discussion are off-topic & often tangential. Additionally, pt frequently begins speaking to self while  or peers are talking, requiring significant redirection. Then, pt given template to write one of their own negative thoughts & practice challenging it w/ a rational counterstatement. Pt talked through this process w/  & nursing, eventually demonstrating emerging understanding of concepts.      Encounter Problems       Encounter Problems (Active)       OT Goals       Pt will explore, identify, and appropriately utilize effective coping strategies to cope with daily stressors and manage emotions with " independence prior to discharge.  (Progressing)       Start:  02/25/24    Expected End:  03/24/24            Pt will demonstrate ability to appropriately modulate emotions and impulses with independence prior to discharge.  (Progressing)       Start:  02/25/24    Expected End:  03/24/24            Pt will explore, identify, and appropriately utilize effective communication strategies to express feelings, wants, and needs with independence prior to discharge.  (Progressing)       Start:  02/25/24    Expected End:  03/24/24                 Education:  Pt provided educational handouts on the following: the “Cognitive Triangle” describing the connection between thoughts, emotions, & behaviors; distorted thinking patterns & ways to challenge distorted thinking. Reviewed, discussed, & practiced. Pt demonstrates emerging understanding though pt's thoughts in general are still disorganized. Pt would likely benefit from reinforced education.

## 2024-02-28 NOTE — GROUP NOTE
Group Topic: Music Therapy   Group Date: 2/27/2024  Start Time: 1300  End Time: 1400  Facilitators: Cecilia Danielle   Department: [facility] EXPRESSIVE THER VIRTUAL    Number of Participants: 7   Group Focus: expressive outlet, music therapy, and self-esteem/task mastery  Treatment Modality: Music Therapy  Interventions Utilized were: active music engagement and passive music engagement      Name: Aida Butts YOB: 1985   MR: [id number]      Level of Participation: active  Quality of Participation: attentive and distracting to others  Interactions with others: intrusive  Mood/Affect: blunted, brightens with interaction, and manic  Cognition, Pre Treatment: attentive and loose  Cognition, Post Treatment: attentive and loose  Progress: Minimal  Plan: continue with services

## 2024-08-19 ENCOUNTER — HOSPITAL ENCOUNTER (EMERGENCY)
Facility: HOSPITAL | Age: 39
Discharge: OTHER INSTITUTION | End: 2024-08-21
Attending: EMERGENCY MEDICINE
Payer: COMMERCIAL

## 2024-08-19 ENCOUNTER — CLINICAL SUPPORT (OUTPATIENT)
Dept: EMERGENCY MEDICINE | Facility: HOSPITAL | Age: 39
End: 2024-08-19
Payer: COMMERCIAL

## 2024-08-19 DIAGNOSIS — F23 ACUTE PSYCHOSIS (MULTI): Primary | ICD-10-CM

## 2024-08-19 DIAGNOSIS — F31.9 BIPOLAR AFFECTIVE DISORDER, REMISSION STATUS UNSPECIFIED (MULTI): ICD-10-CM

## 2024-08-19 DIAGNOSIS — F20.9 SCHIZOPHRENIA, UNSPECIFIED TYPE (MULTI): ICD-10-CM

## 2024-08-19 LAB
ALBUMIN SERPL BCP-MCNC: 4.4 G/DL (ref 3.4–5)
ALP SERPL-CCNC: 73 U/L (ref 33–110)
ALT SERPL W P-5'-P-CCNC: 31 U/L (ref 7–45)
AMPHETAMINES UR QL SCN: ABNORMAL
ANION GAP SERPL CALC-SCNC: 15 MMOL/L (ref 10–20)
APAP SERPL-MCNC: <10 UG/ML
AST SERPL W P-5'-P-CCNC: 30 U/L (ref 9–39)
ATRIAL RATE: 108 BPM
B-HCG SERPL-ACNC: <3 MIU/ML
BARBITURATES UR QL SCN: ABNORMAL
BASOPHILS # BLD AUTO: 0.03 X10*3/UL (ref 0–0.1)
BASOPHILS NFR BLD AUTO: 0.3 %
BENZODIAZ UR QL SCN: ABNORMAL
BILIRUB SERPL-MCNC: 1.2 MG/DL (ref 0–1.2)
BUN SERPL-MCNC: 17 MG/DL (ref 6–23)
BZE UR QL SCN: ABNORMAL
CALCIUM SERPL-MCNC: 9.6 MG/DL (ref 8.6–10.6)
CANNABINOIDS UR QL SCN: ABNORMAL
CHLORIDE SERPL-SCNC: 104 MMOL/L (ref 98–107)
CO2 SERPL-SCNC: 22 MMOL/L (ref 21–32)
CREAT SERPL-MCNC: 0.87 MG/DL (ref 0.5–1.05)
EGFRCR SERPLBLD CKD-EPI 2021: 86 ML/MIN/1.73M*2
EOSINOPHIL # BLD AUTO: 0.14 X10*3/UL (ref 0–0.7)
EOSINOPHIL NFR BLD AUTO: 1.3 %
ERYTHROCYTE [DISTWIDTH] IN BLOOD BY AUTOMATED COUNT: 12.9 % (ref 11.5–14.5)
ETHANOL SERPL-MCNC: <10 MG/DL
FENTANYL+NORFENTANYL UR QL SCN: ABNORMAL
GLUCOSE SERPL-MCNC: 101 MG/DL (ref 74–99)
HCT VFR BLD AUTO: 39 % (ref 36–46)
HGB BLD-MCNC: 13.3 G/DL (ref 12–16)
IMM GRANULOCYTES # BLD AUTO: 0.04 X10*3/UL (ref 0–0.7)
IMM GRANULOCYTES NFR BLD AUTO: 0.4 % (ref 0–0.9)
LYMPHOCYTES # BLD AUTO: 3.19 X10*3/UL (ref 1.2–4.8)
LYMPHOCYTES NFR BLD AUTO: 29.8 %
MCH RBC QN AUTO: 27.5 PG (ref 26–34)
MCHC RBC AUTO-ENTMCNC: 34.1 G/DL (ref 32–36)
MCV RBC AUTO: 81 FL (ref 80–100)
METHADONE UR QL SCN: ABNORMAL
MONOCYTES # BLD AUTO: 1.36 X10*3/UL (ref 0.1–1)
MONOCYTES NFR BLD AUTO: 12.7 %
NEUTROPHILS # BLD AUTO: 5.95 X10*3/UL (ref 1.2–7.7)
NEUTROPHILS NFR BLD AUTO: 55.5 %
NRBC BLD-RTO: 0 /100 WBCS (ref 0–0)
OPIATES UR QL SCN: ABNORMAL
OXYCODONE+OXYMORPHONE UR QL SCN: ABNORMAL
P AXIS: 73 DEGREES
P OFFSET: 201 MS
P ONSET: 150 MS
PCP UR QL SCN: ABNORMAL
PLATELET # BLD AUTO: 357 X10*3/UL (ref 150–450)
POTASSIUM SERPL-SCNC: 3.5 MMOL/L (ref 3.5–5.3)
PR INTERVAL: 144 MS
PROT SERPL-MCNC: 7.5 G/DL (ref 6.4–8.2)
Q ONSET: 222 MS
QRS COUNT: 18 BEATS
QRS DURATION: 80 MS
QT INTERVAL: 342 MS
QTC CALCULATION(BAZETT): 458 MS
QTC FREDERICIA: 416 MS
R AXIS: 56 DEGREES
RBC # BLD AUTO: 4.83 X10*6/UL (ref 4–5.2)
SALICYLATES SERPL-MCNC: <3 MG/DL
SODIUM SERPL-SCNC: 137 MMOL/L (ref 136–145)
T AXIS: 54 DEGREES
T OFFSET: 393 MS
VENTRICULAR RATE: 108 BPM
WBC # BLD AUTO: 10.7 X10*3/UL (ref 4.4–11.3)

## 2024-08-19 PROCEDURE — 96372 THER/PROPH/DIAG INJ SC/IM: CPT

## 2024-08-19 PROCEDURE — 85025 COMPLETE CBC W/AUTO DIFF WBC: CPT

## 2024-08-19 PROCEDURE — 84702 CHORIONIC GONADOTROPIN TEST: CPT

## 2024-08-19 PROCEDURE — 99285 EMERGENCY DEPT VISIT HI MDM: CPT

## 2024-08-19 PROCEDURE — 80143 DRUG ASSAY ACETAMINOPHEN: CPT

## 2024-08-19 PROCEDURE — 93005 ELECTROCARDIOGRAM TRACING: CPT

## 2024-08-19 PROCEDURE — 2500000004 HC RX 250 GENERAL PHARMACY W/ HCPCS (ALT 636 FOR OP/ED)

## 2024-08-19 PROCEDURE — 99285 EMERGENCY DEPT VISIT HI MDM: CPT | Performed by: EMERGENCY MEDICINE

## 2024-08-19 PROCEDURE — 80053 COMPREHEN METABOLIC PANEL: CPT

## 2024-08-19 PROCEDURE — 93010 ELECTROCARDIOGRAM REPORT: CPT | Performed by: EMERGENCY MEDICINE

## 2024-08-19 PROCEDURE — 80307 DRUG TEST PRSMV CHEM ANLYZR: CPT

## 2024-08-19 PROCEDURE — 2500000001 HC RX 250 WO HCPCS SELF ADMINISTERED DRUGS (ALT 637 FOR MEDICARE OP)

## 2024-08-19 PROCEDURE — 36415 COLL VENOUS BLD VENIPUNCTURE: CPT

## 2024-08-19 RX ORDER — HALOPERIDOL 5 MG/ML
5 INJECTION INTRAMUSCULAR ONCE
Status: DISCONTINUED | OUTPATIENT
Start: 2024-08-19 | End: 2024-08-19

## 2024-08-19 RX ORDER — MIDAZOLAM HYDROCHLORIDE 5 MG/ML
5 INJECTION, SOLUTION INTRAMUSCULAR; INTRAVENOUS ONCE
Status: COMPLETED | OUTPATIENT
Start: 2024-08-19 | End: 2024-08-19

## 2024-08-19 RX ORDER — HALOPERIDOL 5 MG/ML
5 INJECTION INTRAMUSCULAR ONCE
Status: COMPLETED | OUTPATIENT
Start: 2024-08-19 | End: 2024-08-19

## 2024-08-19 RX ORDER — OLANZAPINE 5 MG/1
5 TABLET ORAL ONCE
Status: DISCONTINUED | OUTPATIENT
Start: 2024-08-19 | End: 2024-08-19

## 2024-08-19 RX ORDER — MIDAZOLAM HYDROCHLORIDE 1 MG/ML
5 INJECTION INTRAMUSCULAR; INTRAVENOUS ONCE
Status: DISCONTINUED | OUTPATIENT
Start: 2024-08-19 | End: 2024-08-19

## 2024-08-19 RX ORDER — HALOPERIDOL 5 MG/1
10 TABLET ORAL ONCE
Status: DISCONTINUED | OUTPATIENT
Start: 2024-08-19 | End: 2024-08-19

## 2024-08-19 RX ORDER — HALOPERIDOL 5 MG/1
5 TABLET ORAL ONCE
Status: DISCONTINUED | OUTPATIENT
Start: 2024-08-19 | End: 2024-08-20

## 2024-08-19 RX ADMIN — HALOPERIDOL LACTATE 5 MG: 5 INJECTION, SOLUTION INTRAMUSCULAR at 02:16

## 2024-08-19 RX ADMIN — MIDAZOLAM HYDROCHLORIDE 5 MG: 5 INJECTION, SOLUTION INTRAMUSCULAR; INTRAVENOUS at 02:16

## 2024-08-19 SDOH — ECONOMIC STABILITY: HOUSING INSECURITY: FEELS SAFE LIVING IN HOME: YES

## 2024-08-19 SDOH — HEALTH STABILITY: MENTAL HEALTH: NON-SPECIFIC ACTIVE SUICIDAL THOUGHTS (PAST 1 MONTH): NO

## 2024-08-19 SDOH — HEALTH STABILITY: MENTAL HEALTH: WISH TO BE DEAD (PAST 1 MONTH): NO

## 2024-08-19 SDOH — HEALTH STABILITY: MENTAL HEALTH: ANXIETY SYMPTOMS: NO PROBLEMS REPORTED OR OBSERVED.

## 2024-08-19 SDOH — HEALTH STABILITY: MENTAL HEALTH: DEPRESSION SYMPTOMS: NO PROBLEMS REPORTED OR OBSERVED.

## 2024-08-19 SDOH — HEALTH STABILITY: MENTAL HEALTH: SUICIDAL BEHAVIOR (LIFETIME): NO

## 2024-08-19 ASSESSMENT — PAIN DESCRIPTION - PROGRESSION: CLINICAL_PROGRESSION: NOT CHANGED

## 2024-08-19 ASSESSMENT — COLUMBIA-SUICIDE SEVERITY RATING SCALE - C-SSRS
6. HAVE YOU EVER DONE ANYTHING, STARTED TO DO ANYTHING, OR PREPARED TO DO ANYTHING TO END YOUR LIFE?: NO
1. IN THE PAST MONTH, HAVE YOU WISHED YOU WERE DEAD OR WISHED YOU COULD GO TO SLEEP AND NOT WAKE UP?: NO
6. HAVE YOU EVER DONE ANYTHING, STARTED TO DO ANYTHING, OR PREPARED TO DO ANYTHING TO END YOUR LIFE?: NO
2. HAVE YOU ACTUALLY HAD ANY THOUGHTS OF KILLING YOURSELF?: NO
2. HAVE YOU ACTUALLY HAD ANY THOUGHTS OF KILLING YOURSELF?: NO
1. SINCE LAST CONTACT, HAVE YOU WISHED YOU WERE DEAD OR WISHED YOU COULD GO TO SLEEP AND NOT WAKE UP?: NO

## 2024-08-19 ASSESSMENT — PAIN SCALES - GENERAL: PAINLEVEL_OUTOF10: 0 - NO PAIN

## 2024-08-19 ASSESSMENT — PAIN - FUNCTIONAL ASSESSMENT: PAIN_FUNCTIONAL_ASSESSMENT: 0-10

## 2024-08-19 ASSESSMENT — LIFESTYLE VARIABLES
SUBSTANCE_ABUSE_PAST_12_MONTHS: NO
PRESCIPTION_ABUSE_PAST_12_MONTHS: NO

## 2024-08-19 NOTE — ED TRIAGE NOTES
Pt came in via EMS for a psych consult after her son called 9/11 because she is having a manic episode. Pt is talking to herself and is complaining of an acquaintance doing Uatsdin. Pt has rapid speech with delusions. Pt is redirectable at this time. Denies AH, VH, SI and HI.

## 2024-08-19 NOTE — PROGRESS NOTES
Emergency Department Transition of Care Note       Signout   I received Aida Butts in signout from Dr. Washington.  Please see the ED Provider Note for all HPI, PE and MDM up to the time of signout at 0700.  This is in addition to the primary record.    In brief Aida Butts is an 39 y.o. female with PMHx bipolar disorder, schizophrenia who presented for psychiatric evaluation due to auditory and visual hallucinations, manic symptoms including flight of ideas, tangential thought processes, paranoid delusions.    At the time of signout we were awaiting:  EPAT to place    ED Course & Medical Decision Making   Medical Decision Making:  Under my care, patient was seen by EPAT, who determined the patient meets criteria for inpatient psychiatric care.    UDS positive for cannabinoids, laboratory evaluation otherwise unremarkable.  Patient is medically cleared for EPAT placement.    Patient remained calm and cooperative throughout my shift and did not require medications for agitation.    ED Course:       Disposition   Patient was signed out to Dr. Stratton at 1500 pending EPAT to place.  Please see the next provider's transition of care note for the remainder of the patient's care.       Patient seen and discussed with ED attending physician.    Justine Echevarria MD  Emergency Medicine

## 2024-08-19 NOTE — PROGRESS NOTES
EPAT - Social Work Psychiatric Assessment    Arrival Details  Mode of Arrival: Ambulance  Admission Source: Home  Admission Type: Involuntary  EPAT Assessment Start Date: 08/19/24  EPAT Assessment Start Time: 0735  Name of : JOSE Cain LSW    History of Present Illness  Admission Reason: Psychiatric Evaluation  HPI: Patient is a 38yo female presenting to the ED via EMS with chief complaint of psychiatric evaluation. Reportedly, “her son called 911 because she is having a manic episode. Pt is talking to herself and is complaining of an acquaintance doing Buddhism. Pt has rapid speech with delusions. Pt is redirectable at this time”. Patient’s chart and triage reviewed, no provider note available. Patient has a psychiatric history of Schizoaffective Disorder, Bipolar Type. She is not currently connected with outpatient treatment and has a number of previous inpatient admissions, most recently to [facility] 5W 2/24-2/28/24 for concern of manic episode. The patient denies history of self-harm or suicide attempts, no risk indicated at triage. BAL negative, UTOX not available.    SW Readmission Information   Readmission within 30 Days: No    Psychiatric Symptoms  Anxiety Symptoms: No problems reported or observed.  Depression Symptoms: No problems reported or observed.  Lindsay Symptoms: Labile, Less need to sleep, Poor judgment    Psychosis Symptoms  Hallucination Type: No problems reported or observed.  Delusion Type: Paranoid    Additional Symptoms - Adult  Generalized Anxiety Disorder: No problems reported or observed.  Obsessive Compulsive Disorder: No problems reported or observed.  Panic Attack: No problems reported or observed.  Post Traumatic Stress Disorder: No problems reported or observed.  Delirium: No problems reported or observed.    Past Psychiatric History/Meds/Treatments  Past Psychiatric History: Psychiatric Diagnosis: Schizoaffective Disorder, Bipolar Type // Current MH Center: none // Previous  "Admissions: numerous, most recently [facility] 5W 2/2024  Past Psychiatric Meds/Treatments: No current medication; hx of Abilify  Past Violence/Victimization History: Denies; per chart, hx of agitation    Current Mental Health Contacts   Name/Phone Number: none   Last Appointment Date: none  Provider Name/Phone Number: none  Provider Last Appointment Date: none    Support System:  (\"my kind of girlfriend maybe\")    Living Arrangement: House, Lives with someone    Home Safety  Feels Safe Living in Home: Yes    Income Information  Employment Status for: Patient  Employment Status: Disabled  Income Source: Disability    [employer] Service/Education History  Current or Previous  Service: None  Education Level:  (did not assess)    Social/Cultural History  Social History: [place] Citizen: Yes // Payee: none // Guardian/POA: Self  Cultural Requests During Hospitalization: none  Spiritual Requests During Hospitalization: none    Legal  Legal Considerations: Patient/ Family Capacity to Make Sound Judgments  Criminal Activity/ Legal Involvement Pertinent to Current Situation/ Hospitalization: None    Drug Screening  Have you used any substances (canabis, cocaine, heroin, hallucinogens, inhalants, etc.) in the past 12 months?: No  Have you used any prescription drugs other than prescribed in the past 12 months?: No  Is a toxicology screen needed?: Yes    Stage of Change  Stage of Change:  (n/a)    Psychosocial  Psychosocial (WDL): Exceptions to WDL  Behaviors/Mood: Cooperative, Delusions  Affect: Inconsistent with thought content    Orientation  Orientation Level: Disoriented to situation    General Appearance  Motor Activity: Unremarkable  Speech Pattern: Rapid  General Attitude: Cooperative  Appearance/Hygiene: Disheveled    Thought Process  Coherency: Loose associations, Tangential  Content: Delusions, Blaming others  Delusions: Paranoid  Perception: Not altered  Hallucination: None  Judgment/Insight: " Impaired  Confusion: None  Cognition: Poor judgement, Appropriate safety awareness, Appropriate attention/concentration    Sleep Pattern  Sleep Pattern: Restlessness    Risk Factors  Self Harm/Suicidal Ideation Plan: Denies  Previous Self Harm/Suicidal Plans: denies  Risk Factors: Major mental illness    Violence Risk Assessment  Assessment of Violence: None noted  Thoughts of Harm to Others: No    Ability to Assess Risk Screen  Risk Screen - Ability to Assess: Able to be screened  Keller Suicide Severity Rating Scale (Screener/Recent Self-Report)  1. Wish to be Dead (Past 1 Month): No  2. Non-Specific Active Suicidal Thoughts (Past 1 Month): No  6. Suicidal Behavior (Lifetime): No  Calculated C-SSRS Risk Score (Lifetime/Recent): No Risk Indicated  Step 1: Risk Factors  Current & Past Psychiatric Dx: Psychotic disorder, Mood disorder  Presenting Symptoms: Impulsivity, Psychosis  Change in Treatment: Non-compliant or not receiving treatment  Access to Lethal Methods : No  Step 2: Protective Factors   Protective Factors Internal: Frustration tolerance, Identifies reasons for living  Protective Factors External: Responsibility to children  Step 3: Suicidal Ideation Intensity  How Many Times Have You Had These Thoughts: Less than once a week  When You Have the Thoughts How Long do They Last : Fleeting - few seconds or minutes  Could/Can You Stop Thinking About Killing Yourself or Wanting to Die if You Want to: Does not attempt to control thoughts  Are There Things - Anyone or Anything - That Stopped You From Wanting to Die or Acting on: Does not apply  What Sort of Reasons Did You Have For Thinking About Wanting to Die or Killing Yourself: Does not apply  Total Score: 2  Step 5: Documentation  Risk Level: Low suicide risk (Patient remained no/low risk to self, discussed with Dr. Echevarria)    Psychiatric Impression and Plan of Care  Assessment and Plan:    Upon assessment the patient was encountered sleeping but was  easily arousable. Patient presented with tangential thought process and loose associations but remained calm and cooperative throughout. She was, at time, uninhibited and demonstrated some nonsensical speech. The patient denied current SI/HI/AVH, paranoid delusions were elicited throughout. She stated she did not know why she was in the ED, initially reporting “my sons called”. However, patient then expressed belief she did not have any children and was not sure who had contacted EMS for her last night. Patient then again expressed belief that her son had contacted 911 because he “wants the money in the house, wants me out but there's no money”. The patient reports she lives with her mother and has been caring for her, endorses feeling safe in the home, and denies access to firearms. Patient endorses “I sleep” but did not quantify amount of sleep she has been getting, otherwise denies disturbances in appetite/ADLs. The patient spontaneously reported needing to go to the dentist but states she has not had a chance to follow-up with outpatient  providers “because they won't let me” - unable to clarify who “they” are. Patient was unable to provide any contact information for collateral sources.     Diagnostic Impression: Schizoaffective Disorder, Bipolar Type    Psychiatric Impression and Plan for Care: Patient presents as gravely disabled and an elevated risk to others, as evidenced by initial presentation. Recommendation for admission discussed with Justine Echevarria MD who is in agreement.     Specific Resources Provided to Patient: Admission    Outcome/Disposition  Patient's Perception of Outcome Achieved: Unable to assess  Assessment, Recommendations and Risk Level Reviewed with: Dr. Echevarria  Contact Name: none provided  Contact Number(s): -  Contact Relationship: -  EPAT Assessment Completed Date: 08/19/24  EPAT Assessment Completed Time: 0949

## 2024-08-19 NOTE — PROGRESS NOTES
I received Aida Butts in signout from outgoing provider.  Please see the previous note for all HPI, PE and MDM up to the time of signout at 1500.    In brief Aida Butts is an 39 y.o. female presenting for bipolar and schizophrenia, patient is internally stimulated with delusions of performing Latter day.  Her son called 911 believing that she was having a manic episode.  Chief Complaint   Patient presents with    Psychiatric Evaluation          .  At the time of signout we were awaiting: Assessment by EPAT.  The patient is hemodynamically stable with laboratory studies pertinently positive for cannabinoids and benzodiazepines but after receiving benzodiazepines from us.  Otherwise unremarkable laboratory studies.  Patient is medically clear for EPAT.          During my care patient was still awaiting placement by EPAT      Patient seen and discussed with attending of record.    Lito Stratton MD

## 2024-08-20 SDOH — HEALTH STABILITY: MENTAL HEALTH: BEHAVIORS/MOOD: COOPERATIVE

## 2024-08-20 NOTE — PROGRESS NOTES
Observation History and Physical  [facility] EMERGENCY MEDICINE           History of Present Illness     History provided by: Patient and Family Member  Limitations to History: Mental Illness  External Records Reviewed:       Patient History:  Aida Butts is a 39 y.o. female who presented to the emergency department in the context of bipolar disorder and schizoaffective disorder with delusions of performing Mandaeism rituals and paranoia about somebody taking her house from her    Aida Butts was escalated to observation status. Observation was necessary as they continue to require treatment and monitoring of their psychiatric illness while awaiting inpatient behavioral health bed availability.    Physical Exam     Visit Vitals  /71   Pulse 91   Temp 36.5 °C (97.7 °F) (Temporal)   Resp 16   SpO2 98%   Smoking Status Former       GENERAL:  The patient appears nourished and normally developed. Vital signs as documented.     PULMONARY:  Without any respiratory distress. Able to speak full sentences, no accessory muscle use    CARDIAC: Warm and well perfused. No cyanosis.    MUSCULOSKELETAL:   Able to ambulate, Non edematous, with no obvious deformities.     SKIN: No pallor. Intact.    NEURO:  No obvious neurological deficits.  Able to follow commands.    Psych: Resting comfortably on the stretcher, tangential thoughts but calm and cooperative lacks insight.    Impression and Plan           Aida Butts under observation status in [facility] EMERGENCY MEDICINE for psychiatric illness monitoring and treatment while awaiting inpatient behavioral health bed availability.   Emergency Psychiatric Assessment Team has been consulted. Case discussed with them and decision for inpatient hospitalization deemed necessary. Patient is medically clear at this time.     Home medication reconciliation was reviewed and restarted where clinically indicated.     Patient and Family updated on plan  of care.     Lito Stratton MD

## 2024-08-20 NOTE — SIGNIFICANT EVENT
Application for Emergency Admission      Ready for Transfer?  Is the patient medically cleared for transfer to inpatient psychiatry: Yes  Has the patient been accepted to an inpatient psychiatric hospital: Yes    Application for Emergency Admission  IN ACCORDANCE WITH [place]  The Chief Clinical Officer of: Bam 8/20/2024 .12:41 AM    Reason for Hospitalization  The undersigned has reason to believe that: Aida Butts Is a mentally ill person subject to hospitalization by court order under division B Section 5122.01 of the Revised Code, i.e., this person:    1.Yes  Represents a substantial risk of physical harm to self as manifested by evidence of threats of, or attempts at, suicide or serious self-inflicted bodily harm    2.Yes Represents a substantial risk of physical harm to others as manifested by evidence of recent homicidal or other violent behavior, evidence of recent threats that place another in reasonable fear of violent behavior and serious physical harm, or other evidence of present dangerousness    3.Yes Represents a substantial and immediate risk of serious physical impairment or injury to self as manifested by  evidence that the person is unable to provide for and is not providing for the person's basic physical needs because of the person's mental illness and that appropriate provision for those needs cannot be made  immediately available in the community    4.Yes Would benefit from treatment in a hospital for his mental illness and is in need of such treatment as manifested by evidence of behavior that creates a grave and imminent risk to substantial rights of others or  himself.    5.No Would benefit from treatment as manifested by evidence of behavior that indicates all of the following:       (a) The person is unlikely to survive safely in the community without supervision, based on a clinical determination.       (b) The person has a history of lack of compliance with  treatment for mental illness and one of the following applies:      (i) At least twice within the thirty-six months prior to the filing of an affidavit seeking court-ordered treatment of the person under section 5122.111 of the Revised Code, the lack of compliance has been a significant factor in necessitating hospitalization in a hospital or receipt of services in a forensic or other mental health unit of a correctional facility, provided that the thirty-six-month period shall be extended by the length of any hospitalization or incarceration of the person that occurred within the thirty-six-month period.      (ii) Within the forty-eight months prior to the filing of an affidavit seeking court-ordered treatment of the person under section 5122.111 of the Revised Code, the lack of compliance resulted in one or more acts of serious violent behavior toward self or others or threats of, or attempts at, serious physical harm to self or others, provided that the forty-eight-month period shall be extended by the length of any hospitalization or incarceration of the person that occurred within the forty-eight-month period.      (c) The person, as a result of mental illness, is unlikely to voluntarily participate in necessary treatment.       (d) In view of the person's treatment history and current behavior, the person is in need of treatment in order to prevent a relapse or deterioration that would be likely to result in substantial risk of serious harm to the person or others.    (e) Represents a substantial risk of physical harm to self or others if allowed to remain at liberty pending examination.    Therefore, it is requested that said person be admitted to the above named facility.    STATEMENT OF BELIEF    Must be filled out by one of the following: a psychiatrist, licensed physician, licensed clinical psychologist, health or ,  or .  (Statement shall include the circumstances under  which the individual was taken into custody and the reason for the person's belief that hospitalization is necessary. The statement shall also include a reference to efforts made to secure the individual's property at his residence if he was taken into custody there. Every reasonable and appropriate effort should be made to take this person into custody in the least conspicuous manner possible.)    40yo female presenting to the ED via EMS with chief complaint of psychiatric evaluation. Reportedly, “her son called 911 because she is having a manic episode. Pt is talking to herself and is complaining of an acquaintance doing Religious. Pt has rapid speech with delusions. Pt is redirectable at this time”. Patient’s chart and triage reviewed, no provider note available. Patient has a psychiatric history of Schizoaffective Disorder, Bipolar Type. She is not currently connected with outpatient treatment and has a number of previous inpatient admissions, most recently to [facility] 2/24-2/28/24 for concern of manic episode.      Joe Nix MD 8/20/2024     _____________________________________________________________   Place of Employment: [facility]    STATEMENT OF OBSERVATION BY PSYCHIATRIST, LICENSED PHYSICIAN, OR LICENSED CLINICAL PSYCHOLOGIST, IF APPLICABLE    Place of Observation (e.g., [facility], general hospital, office, emergency facility)  (If applicable, please complete)    Joe Nix MD 8/20/2024    _____________________________________________________________

## 2024-08-20 NOTE — PROGRESS NOTES
Observation Disposition Note  [facility] EMERGENCY MEDICINE             Subjective:       Patient is a 38yo female presenting to the ED via EMS with chief complaint of psychiatric evaluation. Reportedly, “her son called 911 because she is having a manic episode. Pt is talking to herself and is complaining of an acquaintance doing Jainism. Pt has rapid speech with delusions. Pt is redirectable at this time”. Patient’s chart and triage reviewed, no provider note available. Patient has a psychiatric history of Schizoaffective Disorder, Bipolar Type. She is not currently connected with outpatient treatment and has a number of previous inpatient admissions, most recently to [facility] [place] [place] [facility] 2/24-2/28/24 for concern of manic episode. The patient denies history of self-harm or suicide attempts, no risk indicated at triage. BAL negative, UTOX not available.       Physical Exam     Visit Vitals  /70 (BP Location: Left arm, Patient Position: Lying)   Pulse 88   Temp 36.7 °C (98.1 °F) (Oral)   Resp 16   SpO2 99%   Smoking Status Former       GENERAL:  The patient appears nourished and normally developed. Vital signs as documented.     PULMONARY:  Without any respiratory distress. Able to speak full sentences, no accessory muscle use    CARDIAC: Warm and well perfused. No cyanosis.    MUSCULOSKELETAL:   Able to ambulate, Non edematous, with no obvious deformities.     SKIN: No pallor. Intact.    NEURO:  No obvious neurological deficits.  Able to follow commands.    Psych: Resting comfortably on the stretcher, tangential thoughts but calm and cooperative lacks insight.       Impresssion and Plan     In summary, Aida Butts has been cared under observation in [facility] for psychiatric illness monitoring and treatment while awaiting inpatient behavioral health bed availability.     Emergency Psychiatric Assessment Team was consulted. Case discussed with them and decision for inpatient  hospitalization deemed necessary.     Patient has been accepted at [facility]    Total length of observation was 24 hours. Dr. Haro is the disposition attending.    Discharge Diagnosis  Psychosis    Joe Nix MD

## 2024-08-21 VITALS
RESPIRATION RATE: 18 BRPM | HEART RATE: 98 BPM | OXYGEN SATURATION: 99 % | HEIGHT: 67 IN | TEMPERATURE: 98.6 F | DIASTOLIC BLOOD PRESSURE: 80 MMHG | SYSTOLIC BLOOD PRESSURE: 143 MMHG | WEIGHT: 204 LBS | BODY MASS INDEX: 32.02 KG/M2

## 2024-08-21 ASSESSMENT — PAIN SCALES - GENERAL: PAINLEVEL_OUTOF10: 0 - NO PAIN

## 2024-08-21 NOTE — ED PROVIDER NOTES
CC: Psychiatric Evaluation (/)     History provided by: Patient  Limitations to History: Patient's psychotic state and lack of cooperation    HPI:    Patient is a 39-year-old female with a PMH of schizoaffective disorder and bipolar disorder who presents to the emergency department via EMS for psychiatric evaluation.  EMS reported the patient's son called 911 because the patient was having a manic episode.  The patient apparently barricaded herself inside her room and it took EMS, police department, and her son several hours to get her out of the room.  No report of any trauma, falls, or injuries. History is limited from patient due to the patient's acute psychotic state.    External Records Reviewed: Previous ED records, inpatient records, and outpatient records  ???????????????????????????????????????????????????????????????  Triage Vitals:  T 37.1 °C (98.7 °F)  HR 71  /77  RR 16  O2 99 % None (Room air)    Physical Exam  Constitutional:       General: She is awake. She is not in acute distress.     Appearance: She is not ill-appearing, toxic-appearing or diaphoretic.      Comments: Pressured, tangential speech.    HENT:      Head: Normocephalic and atraumatic.      Mouth/Throat:      Mouth: Mucous membranes are moist.   Eyes:      General: No scleral icterus.     Extraocular Movements: Extraocular movements intact.      Conjunctiva/sclera: Conjunctivae normal.      Pupils: Pupils are equal, round, and reactive to light.   Cardiovascular:      Rate and Rhythm: Normal rate and regular rhythm.      Pulses:           Radial pulses are 2+ on the right side and 2+ on the left side.        Dorsalis pedis pulses are 2+ on the right side and 2+ on the left side.   Pulmonary:      Effort: Pulmonary effort is normal. No respiratory distress.      Breath sounds: Normal breath sounds.   Abdominal:      General: There is no distension.      Palpations: Abdomen is soft.      Tenderness: There is no abdominal  tenderness.   Musculoskeletal:         General: No deformity. Normal range of motion.      Cervical back: Normal range of motion and neck supple. No tenderness.      Right lower leg: No edema.      Left lower leg: No edema.   Skin:     General: Skin is warm and dry.      Capillary Refill: Capillary refill takes less than 2 seconds.   Neurological:      General: No focal deficit present.      Mental Status: She is alert.      Motor: No weakness.      Comments: Moving all extremities spontaneously   Psychiatric:         Attention and Perception: She is inattentive. She perceives auditory and visual hallucinations.         Mood and Affect: Mood is elated. Affect is labile.         Speech: Speech is rapid and pressured and tangential.         Behavior: Behavior is agitated.         Thought Content: Thought content is paranoid and delusional.         Judgment: Judgment is inappropriate.        ???????????????????????????????????????????????????????????????  ED Course/Treatment/Medical Decision Making    EKG Interpretation:  Sinus tachycardia. Rate of 108 bpm. Normal axis. Normal intervals. No acute ST elevations, depressions, or T wave inversions.  When compared to previous EKG completed on February 2024 sinus tachycardia is present.    Independent Interpretation of Studies:  I independently interpreted: EKG    Differential diagnoses considered include but ar not limited to: Acute psychosis, alcohol intoxication, drug intoxication, electrolyte abnormalities, depression, esteban, anxiety, suicidal ideations, homicidal ideations    Social Determinants Limiting Care:  Poor health literacy and Mental health issues         ED Course:  ED Course as of 08/23/24 1145   Tue Aug 20, 2024   0112 Sign out:  40 y/o F with PMH bipolar, schizophrenia who is here with psychosis and going to [facility].  [SS]      ED Course User Index  [SS] Cecilia Lassiter MD         Diagnoses as of 08/23/24 1145   Acute psychosis (Multi)   Bipolar  affective disorder, remission status unspecified (Multi)   Schizophrenia, unspecified type (Multi)       MDM:    Patient is a 39-year-old female with above PMH who presents to the emergency department for psychiatric evaluation.  Upon arrival patient appears to be acutely psychotic with auditory and visual hallucinations. She has rapid, pressured, and tangential speech. Attempts at verbal de-escalation were made but the patient still refused to cooperate with staff and remained agitated. Patient was medicated with IM Haldol and Versed in order to calm her down. Upon examination there appears to be no acute traumatic injuries. Patient has no focal deficits. She denies having any pain. EKG is nonischemic and without QTc prolongation as dictated above.  Psychiatry clearance labs have been drawn. CBC and CMP are unremarkable. Pregnancy test is negative. Acute tox panel was negative for all. Urine drug screen is pending at this time. EPAT was consulted for patient evaluation. The patient was signed out to oncoming ED provider in stable condition pending EPAT evaluation and pending remainder of patient's ED course and final disposition.     Impression:  Signed out to oncoming ED resident    Dis[facility]:  [facility]    Pt was staffed and discussed with ED attending Dr. Hermelinda Washington, DO   Emergency Medicine, PGY-2      Procedures ? [employer] last updated 8/21/2024 11:31 AM          Mj Washington DO  Resident  08/23/24 1144       Mj Washington DO  Resident  08/23/24 1145      I saw and evaluated the patient. I personally obtained the key and critical portions of the history and physical exam or was physically present for key and critical portions performed by the resident/fellow. I reviewed the resident/fellow's documentation and discussed the patient with the resident/fellow. I agree with the resident/fellow's medical decision making as documented in the note. Patient was signed out to  my colleague, Dr. Romina Campbell at 7 AM pending EPAT evaluation and recommendations and pending remainder of ED course and final disposition.     MD Pastora Love MD  08/23/24 2738

## 2025-06-25 ENCOUNTER — CLINICAL SUPPORT (OUTPATIENT)
Dept: EMERGENCY MEDICINE | Facility: HOSPITAL | Age: 40
End: 2025-06-25
Payer: COMMERCIAL

## 2025-06-25 ENCOUNTER — HOSPITAL ENCOUNTER (EMERGENCY)
Facility: HOSPITAL | Age: 40
Discharge: HOME | End: 2025-06-26
Attending: STUDENT IN AN ORGANIZED HEALTH CARE EDUCATION/TRAINING PROGRAM
Payer: COMMERCIAL

## 2025-06-25 DIAGNOSIS — F25.0 SCHIZOAFFECTIVE DISORDER, BIPOLAR TYPE (MULTI): Primary | ICD-10-CM

## 2025-06-25 LAB
ALBUMIN SERPL BCP-MCNC: 4.5 G/DL (ref 3.4–5)
ALP SERPL-CCNC: 79 U/L (ref 33–110)
ALT SERPL W P-5'-P-CCNC: 19 U/L (ref 7–45)
AMPHETAMINES UR QL SCN: ABNORMAL
ANION GAP SERPL CALC-SCNC: 15 MMOL/L (ref 10–20)
APAP SERPL-MCNC: <10 UG/ML (ref ?–30)
APPEARANCE UR: CLEAR
AST SERPL W P-5'-P-CCNC: 21 U/L (ref 9–39)
BARBITURATES UR QL SCN: ABNORMAL
BASOPHILS # BLD AUTO: 0.02 X10*3/UL (ref 0–0.1)
BASOPHILS NFR BLD AUTO: 0.2 %
BENZODIAZ UR QL SCN: ABNORMAL
BILIRUB SERPL-MCNC: 0.9 MG/DL (ref 0–1.2)
BILIRUB UR STRIP.AUTO-MCNC: NEGATIVE MG/DL
BUN SERPL-MCNC: 11 MG/DL (ref 6–23)
BZE UR QL SCN: ABNORMAL
CALCIUM SERPL-MCNC: 9.8 MG/DL (ref 8.6–10.6)
CANNABINOIDS UR QL SCN: ABNORMAL
CHLORIDE SERPL-SCNC: 105 MMOL/L (ref 98–107)
CO2 SERPL-SCNC: 23 MMOL/L (ref 21–32)
COLOR UR: YELLOW
CREAT SERPL-MCNC: 0.93 MG/DL (ref 0.5–1.05)
EGFRCR SERPLBLD CKD-EPI 2021: 79 ML/MIN/1.73M*2
EOSINOPHIL # BLD AUTO: 0.04 X10*3/UL (ref 0–0.7)
EOSINOPHIL NFR BLD AUTO: 0.4 %
ERYTHROCYTE [DISTWIDTH] IN BLOOD BY AUTOMATED COUNT: 13.3 % (ref 11.5–14.5)
ETHANOL SERPL-MCNC: <10 MG/DL
FENTANYL+NORFENTANYL UR QL SCN: ABNORMAL
GLUCOSE SERPL-MCNC: 114 MG/DL (ref 74–99)
GLUCOSE UR STRIP.AUTO-MCNC: ABNORMAL MG/DL
HCT VFR BLD AUTO: 38.8 % (ref 36–46)
HGB BLD-MCNC: 13 G/DL (ref 12–16)
HYALINE CASTS #/AREA URNS AUTO: ABNORMAL /LPF
IMM GRANULOCYTES # BLD AUTO: 0.03 X10*3/UL (ref 0–0.7)
IMM GRANULOCYTES NFR BLD AUTO: 0.3 % (ref 0–0.9)
KETONES UR STRIP.AUTO-MCNC: NEGATIVE MG/DL
LEUKOCYTE ESTERASE UR QL STRIP.AUTO: NEGATIVE
LYMPHOCYTES # BLD AUTO: 3.24 X10*3/UL (ref 1.2–4.8)
LYMPHOCYTES NFR BLD AUTO: 29.9 %
MCH RBC QN AUTO: 28.3 PG (ref 26–34)
MCHC RBC AUTO-ENTMCNC: 33.5 G/DL (ref 32–36)
MCV RBC AUTO: 85 FL (ref 80–100)
METHADONE UR QL SCN: ABNORMAL
MONOCYTES # BLD AUTO: 1.3 X10*3/UL (ref 0.1–1)
MONOCYTES NFR BLD AUTO: 12 %
NEUTROPHILS # BLD AUTO: 6.22 X10*3/UL (ref 1.2–7.7)
NEUTROPHILS NFR BLD AUTO: 57.2 %
NITRITE UR QL STRIP.AUTO: NEGATIVE
NRBC BLD-RTO: 0 /100 WBCS (ref 0–0)
OPIATES UR QL SCN: ABNORMAL
OXYCODONE+OXYMORPHONE UR QL SCN: ABNORMAL
PCP UR QL SCN: ABNORMAL
PH UR STRIP.AUTO: 6 [PH]
PLATELET # BLD AUTO: 385 X10*3/UL (ref 150–450)
POTASSIUM SERPL-SCNC: 3.7 MMOL/L (ref 3.5–5.3)
PREGNANCY TEST URINE, POC: NEGATIVE
PROT SERPL-MCNC: 7.6 G/DL (ref 6.4–8.2)
PROT UR STRIP.AUTO-MCNC: ABNORMAL MG/DL
RBC # BLD AUTO: 4.59 X10*6/UL (ref 4–5.2)
RBC # UR STRIP.AUTO: ABNORMAL MG/DL
RBC #/AREA URNS AUTO: ABNORMAL /HPF
SALICYLATES SERPL-MCNC: <3 MG/DL (ref ?–20)
SODIUM SERPL-SCNC: 139 MMOL/L (ref 136–145)
SP GR UR STRIP.AUTO: 1.04
SQUAMOUS #/AREA URNS AUTO: ABNORMAL /HPF
TSH SERPL-ACNC: 1.4 MIU/L (ref 0.44–3.98)
UROBILINOGEN UR STRIP.AUTO-MCNC: NORMAL MG/DL
WBC # BLD AUTO: 10.9 X10*3/UL (ref 4.4–11.3)
WBC #/AREA URNS AUTO: ABNORMAL /HPF

## 2025-06-25 PROCEDURE — 80143 DRUG ASSAY ACETAMINOPHEN: CPT

## 2025-06-25 PROCEDURE — 99285 EMERGENCY DEPT VISIT HI MDM: CPT | Performed by: STUDENT IN AN ORGANIZED HEALTH CARE EDUCATION/TRAINING PROGRAM

## 2025-06-25 PROCEDURE — 36415 COLL VENOUS BLD VENIPUNCTURE: CPT

## 2025-06-25 PROCEDURE — 81001 URINALYSIS AUTO W/SCOPE: CPT | Mod: 59

## 2025-06-25 PROCEDURE — 80053 COMPREHEN METABOLIC PANEL: CPT

## 2025-06-25 PROCEDURE — 93010 ELECTROCARDIOGRAM REPORT: CPT | Performed by: STUDENT IN AN ORGANIZED HEALTH CARE EDUCATION/TRAINING PROGRAM

## 2025-06-25 PROCEDURE — 85025 COMPLETE CBC W/AUTO DIFF WBC: CPT

## 2025-06-25 PROCEDURE — 93005 ELECTROCARDIOGRAM TRACING: CPT

## 2025-06-25 PROCEDURE — 81025 URINE PREGNANCY TEST: CPT

## 2025-06-25 PROCEDURE — 80307 DRUG TEST PRSMV CHEM ANLYZR: CPT

## 2025-06-25 PROCEDURE — 84443 ASSAY THYROID STIM HORMONE: CPT

## 2025-06-25 SDOH — HEALTH STABILITY: MENTAL HEALTH: BEHAVIORAL HEALTH(WDL): EXCEPTIONS TO WDL

## 2025-06-25 SDOH — HEALTH STABILITY: MENTAL HEALTH: BEHAVIORS/MOOD: ANXIOUS;HYPER-VERBAL;IMPULSIVE

## 2025-06-25 NOTE — ED TRIAGE NOTES
"Pt presents from home after multiple family members alerted first response about patient having a \"mental breakdown\" pt has history of psychiatric conditions; has not taken medication recently   "

## 2025-06-26 VITALS
HEART RATE: 71 BPM | WEIGHT: 204 LBS | HEIGHT: 67 IN | OXYGEN SATURATION: 99 % | RESPIRATION RATE: 16 BRPM | BODY MASS INDEX: 32.02 KG/M2 | DIASTOLIC BLOOD PRESSURE: 79 MMHG | TEMPERATURE: 97.1 F | SYSTOLIC BLOOD PRESSURE: 119 MMHG

## 2025-06-26 LAB
ATRIAL RATE: 101 BPM
P AXIS: 54 DEGREES
P OFFSET: 200 MS
P ONSET: 146 MS
PR INTERVAL: 154 MS
Q ONSET: 223 MS
QRS COUNT: 17 BEATS
QRS DURATION: 78 MS
QT INTERVAL: 350 MS
QTC CALCULATION(BAZETT): 453 MS
QTC FREDERICIA: 416 MS
R AXIS: 37 DEGREES
T AXIS: 43 DEGREES
T OFFSET: 398 MS
VENTRICULAR RATE: 101 BPM

## 2025-06-26 PROCEDURE — 2500000004 HC RX 250 GENERAL PHARMACY W/ HCPCS (ALT 636 FOR OP/ED)

## 2025-06-26 PROCEDURE — 96372 THER/PROPH/DIAG INJ SC/IM: CPT

## 2025-06-26 RX ORDER — OLANZAPINE 5 MG/1
5 TABLET, ORALLY DISINTEGRATING ORAL NIGHTLY
Status: DISCONTINUED | OUTPATIENT
Start: 2025-06-26 | End: 2025-06-26 | Stop reason: HOSPADM

## 2025-06-26 RX ORDER — HALOPERIDOL LACTATE 5 MG/ML
INJECTION, SOLUTION INTRAMUSCULAR
Status: COMPLETED
Start: 2025-06-26 | End: 2025-06-26

## 2025-06-26 RX ORDER — MIDAZOLAM HYDROCHLORIDE 5 MG/ML
INJECTION, SOLUTION INTRAMUSCULAR; INTRAVENOUS
Status: COMPLETED
Start: 2025-06-26 | End: 2025-06-26

## 2025-06-26 RX ORDER — HALOPERIDOL LACTATE 5 MG/ML
5 INJECTION, SOLUTION INTRAMUSCULAR ONCE
Status: COMPLETED | OUTPATIENT
Start: 2025-06-26 | End: 2025-06-26

## 2025-06-26 RX ORDER — MIDAZOLAM HYDROCHLORIDE 5 MG/ML
5 INJECTION, SOLUTION INTRAMUSCULAR; INTRAVENOUS ONCE
Status: COMPLETED | OUTPATIENT
Start: 2025-06-26 | End: 2025-06-26

## 2025-06-26 RX ADMIN — HALOPERIDOL LACTATE 5 MG: 5 INJECTION, SOLUTION INTRAMUSCULAR at 06:26

## 2025-06-26 RX ADMIN — MIDAZOLAM HYDROCHLORIDE 5 MG: 5 INJECTION, SOLUTION INTRAMUSCULAR; INTRAVENOUS at 06:26

## 2025-06-26 SDOH — HEALTH STABILITY: MENTAL HEALTH: BEHAVIORAL HEALTH(WDL): EXCEPTIONS TO WDL

## 2025-06-26 SDOH — HEALTH STABILITY: MENTAL HEALTH: BEHAVIORS/MOOD: ANXIOUS;HYPER-VERBAL;IMPULSIVE;TEARFUL

## 2025-06-26 ASSESSMENT — PAIN - FUNCTIONAL ASSESSMENT: PAIN_FUNCTIONAL_ASSESSMENT: 0-10

## 2025-06-26 ASSESSMENT — PAIN SCALES - GENERAL: PAINLEVEL_OUTOF10: 0 - NO PAIN

## 2025-06-26 NOTE — PROGRESS NOTES
Emergency Department Transition of Care Note       Signout   I received Aida Butts in signout from Dr. Lacey Yancey.  Please see the ED Provider Note for all HPI, PE and MDM up to the time of signout at 0700.  This is in addition to the primary record.    In brief Aida Butts is an 39 y.o. female presenting for exacerbation of her schizoaffective disorder and bipolar disorder.  The patient is medically cleared and is awaiting EPAT placement at the time of signout.    At the time of signout we were awaiting:  EPAT placement    ED Course & Medical Decision Making   Medical Decision Making:  Under my care, the patient was reevaluated and is resting comfortably in bed.  The patient continues to display psychotic behaviors but is redirectable and did not require further medication throughout the ED course.  The patient was placed to [facility] and a pink slip was placed into the patient's chart and an EMTALA form was completed.  The patient was monitored until transport arrived and she was transferred to [facility] in stable condition.    ED Course:  Diagnoses as of 06/27/25 1039   Schizoaffective disorder, bipolar type (Multi)       Disposition   The patient has been placed at [facility] by the ED behavioral health team.  A pink slip and transfer note will be placed in the chart.  We will continue to monitor and manage the patient in the emergency department until transport for the transfer can be arranged.    Procedures   Procedures    Patient seen and discussed with ED attending physician.    Christiano Sheppard, DO  Emergency Medicine    ** Please excuse any errors in grammar or translation related to this dictation. Voice recognition software was utilized to prepare this document. **       Geronimo Herron MD  [facility] Emergency Medicine

## 2025-06-26 NOTE — PROGRESS NOTES
Behavioral Restraint / Seclusion Face to Face Assessment    Patient Name:         Aida Butts  YOB: 1985  Medical Record #:   [id number]      Documentation of restraint type placed: Restraint Type  Seclusion (V): CONTINUED (06/26/25 0546 : Gail Mills RN)    Date Assessment was completed: 6/26/2025    Time patient was assessed: 6:24 AM     Description of behavior causing restraint/seclusion: banging on walls and dancing around naked in her room    Type of intervention: Mechanical restraint    Patient's immediate situation: no signs of physical distress    Alternatives Attempted: Alternatives attempted and have been ineffective.    Contraindications for Restraints: Reviewed contraindications for continued restraint use and agree to on-going need.    The medication administered is appropriate for the patient's condition based on imminent danger failing alternatives attempted: Yes    Patent's reaction to intervention: appears safe    Patient's medical condition: normal circulation and breathing    Patient's behavioral condition: continues to display agitated, threatening, or violent behavior to self    Plan: Discontinue restraints when patient meets criteria

## 2025-06-26 NOTE — SIGNIFICANT EVENT
Application for Emergency Admission      Ready for Transfer?  Is the patient medically cleared for transfer to inpatient psychiatry: Yes  Has the patient been accepted to an inpatient psychiatric hospital: Yes    Application for Emergency Admission  IN ACCORDANCE WITH [place] 5122[place]10 [place]  The Chief Clinical Officer of: Katie Wilcox 6/26/2025 .10:39 AM    Reason for Hospitalization  The undersigned has reason to believe that: Aida Butts Is a mentally ill person subject to hospitalization by court order under division B Section 5122.01 of the Revised Code, i.e., this person:    1.Yes  Represents a substantial risk of physical harm to self as manifested by evidence of threats of, or attempts at, suicide or serious self-inflicted bodily harm    2.Yes Represents a substantial risk of physical harm to others as manifested by evidence of recent homicidal or other violent behavior, evidence of recent threats that place another in reasonable fear of violent behavior and serious physical harm, or other evidence of present dangerousness    3.Yes Represents a substantial and immediate risk of serious physical impairment or injury to self as manifested by  evidence that the person is unable to provide for and is not providing for the person's basic physical needs because of the person's mental illness and that appropriate provision for those needs cannot be made  immediately available in the community    4.Yes Would benefit from treatment in a hospital for his mental illness and is in need of such treatment as manifested by evidence of behavior that creates a grave and imminent risk to substantial rights of others or  himself.    5.Yes Would benefit from treatment as manifested by evidence of behavior that indicates all of the following:       (a) The person is unlikely to survive safely in the community without supervision, based on a clinical determination.       (b) The person has a history of lack of compliance  with treatment for mental illness and one of the following applies:      (i) At least twice within the thirty-six months prior to the filing of an affidavit seeking court-ordered treatment of the person under section 5122.111 of the Revised Code, the lack of compliance has been a significant factor in necessitating hospitalization in a hospital or receipt of services in a forensic or other mental health unit of a correctional facility, provided that the thirty-six-month period shall be extended by the length of any hospitalization or incarceration of the person that occurred within the thirty-six-month period.      (ii) Within the forty-eight months prior to the filing of an affidavit seeking court-ordered treatment of the person under section 5122.111 of the Revised Code, the lack of compliance resulted in one or more acts of serious violent behavior toward self or others or threats of, or attempts at, serious physical harm to self or others, provided that the forty-eight-month period shall be extended by the length of any hospitalization or incarceration of the person that occurred within the forty-eight-month period.      (c) The person, as a result of mental illness, is unlikely to voluntarily participate in necessary treatment.       (d) In view of the person's treatment history and current behavior, the person is in need of treatment in order to prevent a relapse or deterioration that would be likely to result in substantial risk of serious harm to the person or others.    (e) Represents a substantial risk of physical harm to self or others if allowed to remain at liberty pending examination.    Therefore, it is requested that said person be admitted to the above named facility.    STATEMENT OF BELIEF    Must be filled out by one of the following: a psychiatrist, licensed physician, licensed clinical psychologist, health or ,  or .  (Statement shall include the circumstances  under which the individual was taken into custody and the reason for the person's belief that hospitalization is necessary. The statement shall also include a reference to efforts made to secure the individual's property at his residence if he was taken into custody there. Every reasonable and appropriate effort should be made to take this person into custody in the least conspicuous manner possible.)    Patient with schizoaffective d/o and bipolar d/o, acutely agitated and pscyhotic. Needing placement.      Geronimo Herron MD 6/26/2025     _____________________________________________________________   Place of Employment: [facility] [employer]    STATEMENT OF OBSERVATION BY PSYCHIATRIST, LICENSED PHYSICIAN, OR LICENSED CLINICAL PSYCHOLOGIST, IF APPLICABLE    Place of Observation (e.g., [facility], general hospital, office, emergency facility)  (If applicable, please complete)    Geronimo Herron MD 6/26/2025    _____________________________________________________________

## 2025-06-26 NOTE — ED PROVIDER NOTES
Emergency Department Provider Note        History of Present Illness     CC: Psychiatric Evaluation     HPI:  This is a 39-year-old female who presents emergency department for psychiatric evaluation.  Patient has a history of schizoaffective disorder and bipolar.  According to EMS, multiple family members recommended that she be evaluated for having a mental breakdown.  She is not taking any of her medications.  Upon arrival here, she has no medical complaints.  She specifically denies chest pain, shortness of breath, abdominal pain, vomiting.  She is difficult to have a conversation with as she is rambling.    Limitations to history: None  Independent historian(s): None   Records Reviewed: Recent available ED and inpatient notes reviewed in EMR.    PMHx/PSHx:  Per HPI.   - has no past medical history on file.  - has no past surgical history on file.    Medications:  Reviewed in EMR. See EMR for complete list of medications and doses.    Allergies:  Patient has no known allergies.    Social History:  - Tobacco:  reports that she has quit smoking. Her smoking use included cigarettes. She does not have any smokeless tobacco history on file.   - Alcohol:  has no history on file for alcohol use.   - Illicit Drugs:  has no history on file for drug use.     ROS:  Per HPI.       Physical Exam     Triage Vitals:  T 36.2 °C (97.2 °F)  HR (!) 110  /80  RR 16  O2 97 %      General: Female patient sitting on the edge of the bed, no acute cardiorespiratory distress, eating, appears comfortable, appropriately interactive.  Head: Normocephalic. Atraumatic.  Neck: FROM. No gross masses.   Eyes: EOMI. Conjunctiva clear.   ENT: Moist mucous membranes, no apparent trauma or lesions.  CV: Regular rate. 2+ radial pulses bilaterally.  Resp: No respiratory distress, breathing easily. Speaks in full sentences.    GI: Soft, non-distended. No tenderness with palpation.   EXT: No peripheral edema, contusions, or wounds.   Skin:  Warm and dry, no rashes or lesions.  Neuro: Alert. No focal neurological deficits. Motor and sensation intact throughout. Speech fluent. Normal gait.   Psych: Appears to be internally stimulated.  Pressured speech and tangential.  Redirectable.      Medical Decision Making & ED Course     Labs:   Labs Reviewed   CBC WITH AUTO DIFFERENTIAL - Abnormal       Result Value    WBC 10.9      nRBC 0.0      RBC 4.59      Hemoglobin 13.0      Hematocrit 38.8      MCV 85      MCH 28.3      MCHC 33.5      RDW 13.3      Platelets 385      Neutrophils % 57.2      Immature Granulocytes %, Automated 0.3      Lymphocytes % 29.9      Monocytes % 12.0      Eosinophils % 0.4      Basophils % 0.2      Neutrophils Absolute 6.22      Immature Granulocytes Absolute, Automated 0.03      Lymphocytes Absolute 3.24      Monocytes Absolute 1.30 (*)     Eosinophils Absolute 0.04      Basophils Absolute 0.02     COMPREHENSIVE METABOLIC PANEL - Abnormal    Glucose 114 (*)     Sodium 139      Potassium 3.7      Chloride 105      Bicarbonate 23      Anion Gap 15      Urea Nitrogen 11      Creatinine 0.93      eGFR 79      Calcium 9.8      Albumin 4.5      Alkaline Phosphatase 79      Total Protein 7.6      AST 21      Bilirubin, Total 0.9      ALT 19     DRUG SCREEN,URINE - Abnormal    Amphetamine Screen, Urine Presumptive Negative      Barbiturate Screen, Urine Presumptive Negative      Benzodiazepines Screen, Urine Presumptive Negative      Cannabinoid Screen, Urine Presumptive Positive (*)     Cocaine Metabolite Screen, Urine Presumptive Negative      Fentanyl Screen, Urine Presumptive Negative      Opiate Screen, Urine Presumptive Negative      Oxycodone Screen, Urine Presumptive Negative      PCP Screen, Urine Presumptive Negative      Methadone Screen, Urine Presumptive Negative      Narrative:     Drug screen results are presumptive and should not be used to assess   compliance with prescribed medication. Contact the [facility]  laboratory   to add-on definitive confirmatory testing if clinically indicated.    Toxicology screening results are reported qualitatively. The concentration must   be greater than or equal to the cutoff to be reported as positive. The concentration   at which the screening test can detect an individual drug or metabolite varies.   The absence of expected drug(s) and/or drug metabolite(s) may indicate non-compliance,   inappropriate timing of specimen collection relative to drug administration, poor drug   absorption, diluted/adulterated urine, or limitations of testing. For medical purposes   only; not valid for forensic use.    Interpretive questions should be directed to the laboratory medical directors.   URINALYSIS WITH REFLEX MICROSCOPIC - Abnormal    Color, Urine Yellow      Appearance, Urine Clear      Specific Gravity, Urine 1.037 (*)     pH, Urine 6.0      Protein, Urine 70 (1+) (*)     Glucose, Urine 30 (TRACE) (*)     Blood, Urine 0.03 (TRACE) (*)     Ketones, Urine NEGATIVE      Bilirubin, Urine NEGATIVE      Urobilinogen, Urine Normal      Nitrite, Urine NEGATIVE      Leukocyte Esterase, Urine NEGATIVE     MICROSCOPIC ONLY, URINE - Abnormal    WBC, Urine 6-10 (*)     RBC, Urine 6-10 (*)     Squamous Epithelial Cells, Urine 1-9 (SPARSE)      Hyaline Casts, Urine 2+ (*)    ACUTE TOXICOLOGY PANEL, BLOOD - Normal    Acetaminophen <10.0      Salicylate  <3      Alcohol <10     TSH WITH REFLEX TO FREE T4 IF ABNORMAL - Normal    Thyroid Stimulating Hormone 1.40      Narrative:     TSH testing is performed using different testing methodology at [facility] than at other [facility]. Direct result comparisons should only be made within the same method.     POCT PREGNANCY, URINE - Normal    Preg Test, Ur Negative          Imaging:   No orders to display        EK: 52: Rate of 101 bpm, regular rhythm, normal axis, normal intervals, no evidence of ST segment elevations or depressions, no  pattern to T wave abnormalities.    MDM:  This is a 39-year-old female with a history of schizoaffective and bipolar who presents to the emergency department with concerns for decompensated psychiatric illness.  She is hemodynamically stable and in no significant distress upon arrival.  Mildly tachycardic however this did improve.  Her physical exam is unremarkable.  She has no medical complaints.  Medical clearance initiated and EPAT consulted for formal psychiatric evaluation.  At this time she is handed off to oncoming emergency department provider pending their recommendations.      ED Course:  Diagnoses as of 06/26/25 0825   Schizoaffective disorder, bipolar type (Multi)       Independent Result Review and Interpretation: Relevant laboratory and radiographic results were reviewed and independently interpreted by myself.  As necessary, they are commented on in the ED Course.    Social Determinants Limiting Care:  Mental health issues      Patient seen by and discussed with the attending emergency medicine physician.       Disposition    Sign Out    Ludin Pelletier DO   Emergency Medicine PGY-3  [facility]      Procedures      Procedures ? SmartLinks last updated 6/26/2025 8:25 AM          Ludin Pelletier DO  Resident  06/26/25 0825

## 2025-06-26 NOTE — PROGRESS NOTES
Behavioral Restraint / Seclusion Face to Face Assessment    Patient Name:         Aida Butts  YOB: 1985  Medical Record #:   [id number]      Documentation of restraint type placed:      Date Assessment was completed: 6/26/2025    Time patient was assessed: 4:40 AM     Description of behavior causing restraint/seclusion: leaving room frequently without ability to be redirected    Type of intervention: Seclusion    Patient's immediate situation: no signs of physical distress    Alternatives Attempted: Alternatives attempted and have been ineffective.    Contraindications for Restraints: Reviewed contraindications for continued restraint use and agree to on-going need.    The medication administered is appropriate for the patient's condition based on imminent danger failing alternatives attempted: N/A    Patent's reaction to intervention: appears comfortable    Patient's medical condition: normal circulation and breathing    Patient's behavioral condition: continues to display agitated, threatening, or violent behavior to others    Plan: Discontinue restraints when patient meets criteria
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
